# Patient Record
Sex: MALE | Race: BLACK OR AFRICAN AMERICAN | NOT HISPANIC OR LATINO | Employment: FULL TIME | URBAN - METROPOLITAN AREA
[De-identification: names, ages, dates, MRNs, and addresses within clinical notes are randomized per-mention and may not be internally consistent; named-entity substitution may affect disease eponyms.]

---

## 2022-10-07 ENCOUNTER — HOSPITAL ENCOUNTER (INPATIENT)
Facility: HOSPITAL | Age: 42
LOS: 3 days | Discharge: HOME | DRG: 305 | End: 2022-10-10
Attending: EMERGENCY MEDICINE | Admitting: INTERNAL MEDICINE

## 2022-10-07 DIAGNOSIS — R07.9 CHEST PAIN: ICD-10-CM

## 2022-10-07 DIAGNOSIS — R06.02 SHORTNESS OF BREATH: ICD-10-CM

## 2022-10-07 DIAGNOSIS — D63.8 ANEMIA OF CHRONIC DISEASE: ICD-10-CM

## 2022-10-07 DIAGNOSIS — I50.9 CONGESTIVE HEART FAILURE, UNSPECIFIED HF CHRONICITY, UNSPECIFIED HEART FAILURE TYPE: ICD-10-CM

## 2022-10-07 DIAGNOSIS — I50.22 CHRONIC SYSTOLIC HEART FAILURE: Primary | ICD-10-CM

## 2022-10-07 DIAGNOSIS — R79.89 ELEVATED TROPONIN: ICD-10-CM

## 2022-10-07 DIAGNOSIS — I16.1 HYPERTENSIVE EMERGENCY: ICD-10-CM

## 2022-10-07 DIAGNOSIS — N18.32 STAGE 3B CHRONIC KIDNEY DISEASE (CKD): ICD-10-CM

## 2022-10-07 PROBLEM — N17.9 AKI (ACUTE KIDNEY INJURY): Status: ACTIVE | Noted: 2022-10-07

## 2022-10-07 PROBLEM — D64.9 NORMOCYTIC ANEMIA: Status: ACTIVE | Noted: 2022-10-07

## 2022-10-07 LAB
ALBUMIN SERPL BCP-MCNC: 3.5 G/DL (ref 3.5–5.2)
ALP SERPL-CCNC: 78 U/L (ref 55–135)
ALT SERPL W/O P-5'-P-CCNC: 27 U/L (ref 10–44)
AMPHET+METHAMPHET UR QL: NEGATIVE
ANION GAP SERPL CALC-SCNC: 11 MMOL/L (ref 8–16)
AST SERPL-CCNC: 23 U/L (ref 10–40)
BACTERIA #/AREA URNS HPF: ABNORMAL /HPF
BARBITURATES UR QL SCN>200 NG/ML: NEGATIVE
BASOPHILS # BLD AUTO: 0.02 K/UL (ref 0–0.2)
BASOPHILS NFR BLD: 0.2 % (ref 0–1.9)
BENZODIAZ UR QL SCN>200 NG/ML: NEGATIVE
BILIRUB SERPL-MCNC: 0.7 MG/DL (ref 0.1–1)
BILIRUB UR QL STRIP: NEGATIVE
BNP SERPL-MCNC: 869 PG/ML (ref 0–99)
BUN SERPL-MCNC: 23 MG/DL (ref 6–20)
BZE UR QL SCN: NEGATIVE
CALCIUM SERPL-MCNC: 9.4 MG/DL (ref 8.7–10.5)
CANNABINOIDS UR QL SCN: ABNORMAL
CHLORIDE SERPL-SCNC: 107 MMOL/L (ref 95–110)
CK SERPL-CCNC: 430 U/L (ref 20–200)
CLARITY UR: CLEAR
CO2 SERPL-SCNC: 23 MMOL/L (ref 23–29)
COLOR UR: YELLOW
CREAT SERPL-MCNC: 2.2 MG/DL (ref 0.5–1.4)
CREAT UR-MCNC: 272.2 MG/DL (ref 23–375)
CREAT UR-MCNC: 272.2 MG/DL (ref 23–375)
DIFFERENTIAL METHOD: ABNORMAL
EOSINOPHIL # BLD AUTO: 0.1 K/UL (ref 0–0.5)
EOSINOPHIL NFR BLD: 0.4 % (ref 0–8)
ERYTHROCYTE [DISTWIDTH] IN BLOOD BY AUTOMATED COUNT: 13.2 % (ref 11.5–14.5)
EST. GFR  (NO RACE VARIABLE): 37 ML/MIN/1.73 M^2
GLUCOSE SERPL-MCNC: 91 MG/DL (ref 70–110)
GLUCOSE UR QL STRIP: ABNORMAL
HCT VFR BLD AUTO: 36.2 % (ref 40–54)
HGB BLD-MCNC: 11.4 G/DL (ref 14–18)
HGB UR QL STRIP: ABNORMAL
HYALINE CASTS #/AREA URNS LPF: 3 /LPF
IMM GRANULOCYTES # BLD AUTO: 0.05 K/UL (ref 0–0.04)
IMM GRANULOCYTES NFR BLD AUTO: 0.4 % (ref 0–0.5)
KETONES UR QL STRIP: NEGATIVE
LEUKOCYTE ESTERASE UR QL STRIP: NEGATIVE
LYMPHOCYTES # BLD AUTO: 1.6 K/UL (ref 1–4.8)
LYMPHOCYTES NFR BLD: 13.9 % (ref 18–48)
MCH RBC QN AUTO: 29.3 PG (ref 27–31)
MCHC RBC AUTO-ENTMCNC: 31.5 G/DL (ref 32–36)
MCV RBC AUTO: 93 FL (ref 82–98)
METHADONE UR QL SCN>300 NG/ML: NEGATIVE
MICROSCOPIC COMMENT: ABNORMAL
MONOCYTES # BLD AUTO: 0.6 K/UL (ref 0.3–1)
MONOCYTES NFR BLD: 5 % (ref 4–15)
NEUTROPHILS # BLD AUTO: 9.4 K/UL (ref 1.8–7.7)
NEUTROPHILS NFR BLD: 80.1 % (ref 38–73)
NITRITE UR QL STRIP: NEGATIVE
NRBC BLD-RTO: 0 /100 WBC
OPIATES UR QL SCN: NEGATIVE
PCP UR QL SCN>25 NG/ML: NEGATIVE
PH UR STRIP: 6 [PH] (ref 5–8)
PLATELET # BLD AUTO: 480 K/UL (ref 150–450)
PMV BLD AUTO: 8.3 FL (ref 9.2–12.9)
POTASSIUM SERPL-SCNC: 4 MMOL/L (ref 3.5–5.1)
PROT SERPL-MCNC: 6.9 G/DL (ref 6–8.4)
PROT UR QL STRIP: ABNORMAL
PROT UR-MCNC: 153 MG/DL (ref 0–15)
PROT/CREAT UR: 0.56 MG/G{CREAT} (ref 0–0.2)
RBC # BLD AUTO: 3.89 M/UL (ref 4.6–6.2)
RBC #/AREA URNS HPF: 0 /HPF (ref 0–4)
SARS-COV-2 RDRP RESP QL NAA+PROBE: NEGATIVE
SODIUM SERPL-SCNC: 141 MMOL/L (ref 136–145)
SODIUM UR-SCNC: 70 MMOL/L (ref 20–250)
SP GR UR STRIP: 1.03 (ref 1–1.03)
SQUAMOUS #/AREA URNS HPF: 1 /HPF
TOXICOLOGY INFORMATION: ABNORMAL
TROPONIN I SERPL DL<=0.01 NG/ML-MCNC: 0.08 NG/ML (ref 0–0.03)
TROPONIN I SERPL DL<=0.01 NG/ML-MCNC: 0.08 NG/ML (ref 0–0.03)
TROPONIN I SERPL DL<=0.01 NG/ML-MCNC: 0.1 NG/ML (ref 0–0.03)
URN SPEC COLLECT METH UR: ABNORMAL
UROBILINOGEN UR STRIP-ACNC: NEGATIVE EU/DL
UUN UR-MCNC: >1200 MG/DL (ref 140–1050)
WBC # BLD AUTO: 11.69 K/UL (ref 3.9–12.7)
WBC #/AREA URNS HPF: 1 /HPF (ref 0–5)

## 2022-10-07 PROCEDURE — 25000003 PHARM REV CODE 250: Performed by: STUDENT IN AN ORGANIZED HEALTH CARE EDUCATION/TRAINING PROGRAM

## 2022-10-07 PROCEDURE — 84156 ASSAY OF PROTEIN URINE: CPT | Performed by: EMERGENCY MEDICINE

## 2022-10-07 PROCEDURE — 93010 EKG 12-LEAD: ICD-10-PCS | Mod: ,,, | Performed by: INTERNAL MEDICINE

## 2022-10-07 PROCEDURE — 81000 URINALYSIS NONAUTO W/SCOPE: CPT | Mod: 59 | Performed by: EMERGENCY MEDICINE

## 2022-10-07 PROCEDURE — 84540 ASSAY OF URINE/UREA-N: CPT | Performed by: EMERGENCY MEDICINE

## 2022-10-07 PROCEDURE — 96374 THER/PROPH/DIAG INJ IV PUSH: CPT

## 2022-10-07 PROCEDURE — 99900035 HC TECH TIME PER 15 MIN (STAT)

## 2022-10-07 PROCEDURE — 83880 ASSAY OF NATRIURETIC PEPTIDE: CPT | Performed by: EMERGENCY MEDICINE

## 2022-10-07 PROCEDURE — 93010 ELECTROCARDIOGRAM REPORT: CPT | Mod: ,,, | Performed by: INTERNAL MEDICINE

## 2022-10-07 PROCEDURE — 99285 EMERGENCY DEPT VISIT HI MDM: CPT | Mod: 25

## 2022-10-07 PROCEDURE — 25000003 PHARM REV CODE 250: Performed by: EMERGENCY MEDICINE

## 2022-10-07 PROCEDURE — 85025 COMPLETE CBC W/AUTO DIFF WBC: CPT | Performed by: EMERGENCY MEDICINE

## 2022-10-07 PROCEDURE — 96375 TX/PRO/DX INJ NEW DRUG ADDON: CPT | Mod: 59

## 2022-10-07 PROCEDURE — 82550 ASSAY OF CK (CPK): CPT | Performed by: EMERGENCY MEDICINE

## 2022-10-07 PROCEDURE — 25000003 PHARM REV CODE 250: Performed by: INTERNAL MEDICINE

## 2022-10-07 PROCEDURE — 93005 ELECTROCARDIOGRAM TRACING: CPT

## 2022-10-07 PROCEDURE — 63600175 PHARM REV CODE 636 W HCPCS: Performed by: STUDENT IN AN ORGANIZED HEALTH CARE EDUCATION/TRAINING PROGRAM

## 2022-10-07 PROCEDURE — 84484 ASSAY OF TROPONIN QUANT: CPT | Mod: 91 | Performed by: STUDENT IN AN ORGANIZED HEALTH CARE EDUCATION/TRAINING PROGRAM

## 2022-10-07 PROCEDURE — 80307 DRUG TEST PRSMV CHEM ANLYZR: CPT | Performed by: EMERGENCY MEDICINE

## 2022-10-07 PROCEDURE — 84300 ASSAY OF URINE SODIUM: CPT | Performed by: EMERGENCY MEDICINE

## 2022-10-07 PROCEDURE — 20000000 HC ICU ROOM

## 2022-10-07 PROCEDURE — U0002 COVID-19 LAB TEST NON-CDC: HCPCS | Performed by: EMERGENCY MEDICINE

## 2022-10-07 PROCEDURE — 36415 COLL VENOUS BLD VENIPUNCTURE: CPT | Performed by: STUDENT IN AN ORGANIZED HEALTH CARE EDUCATION/TRAINING PROGRAM

## 2022-10-07 PROCEDURE — 80053 COMPREHEN METABOLIC PANEL: CPT | Performed by: EMERGENCY MEDICINE

## 2022-10-07 PROCEDURE — 84484 ASSAY OF TROPONIN QUANT: CPT | Performed by: EMERGENCY MEDICINE

## 2022-10-07 PROCEDURE — 63600175 PHARM REV CODE 636 W HCPCS: Performed by: EMERGENCY MEDICINE

## 2022-10-07 RX ORDER — ASPIRIN 325 MG
325 TABLET ORAL DAILY
Status: DISCONTINUED | OUTPATIENT
Start: 2022-10-08 | End: 2022-10-10

## 2022-10-07 RX ORDER — SODIUM CHLORIDE 0.9 % (FLUSH) 0.9 %
10 SYRINGE (ML) INJECTION
Status: DISCONTINUED | OUTPATIENT
Start: 2022-10-07 | End: 2022-10-10 | Stop reason: HOSPADM

## 2022-10-07 RX ORDER — NITROGLYCERIN 20 MG/100ML
0-200 INJECTION INTRAVENOUS CONTINUOUS
Status: DISCONTINUED | OUTPATIENT
Start: 2022-10-07 | End: 2022-10-09

## 2022-10-07 RX ORDER — METOPROLOL TARTRATE 50 MG/1
50 TABLET ORAL
Status: COMPLETED | OUTPATIENT
Start: 2022-10-07 | End: 2022-10-07

## 2022-10-07 RX ORDER — NITROGLYCERIN 20 MG/100ML
0-200 INJECTION INTRAVENOUS CONTINUOUS
Status: DISCONTINUED | OUTPATIENT
Start: 2022-10-07 | End: 2022-10-07

## 2022-10-07 RX ORDER — HYDRALAZINE HYDROCHLORIDE 25 MG/1
25 TABLET, FILM COATED ORAL EVERY 8 HOURS
Status: DISCONTINUED | OUTPATIENT
Start: 2022-10-07 | End: 2022-10-08

## 2022-10-07 RX ORDER — MAGNESIUM SULFATE HEPTAHYDRATE 40 MG/ML
2 INJECTION, SOLUTION INTRAVENOUS ONCE
Status: COMPLETED | OUTPATIENT
Start: 2022-10-07 | End: 2022-10-07

## 2022-10-07 RX ORDER — LABETALOL HYDROCHLORIDE 5 MG/ML
10 INJECTION, SOLUTION INTRAVENOUS
Status: COMPLETED | OUTPATIENT
Start: 2022-10-07 | End: 2022-10-07

## 2022-10-07 RX ORDER — FUROSEMIDE 10 MG/ML
40 INJECTION INTRAMUSCULAR; INTRAVENOUS
Status: COMPLETED | OUTPATIENT
Start: 2022-10-07 | End: 2022-10-07

## 2022-10-07 RX ORDER — MUPIROCIN 20 MG/G
OINTMENT TOPICAL 2 TIMES DAILY
Status: DISCONTINUED | OUTPATIENT
Start: 2022-10-07 | End: 2022-10-10 | Stop reason: HOSPADM

## 2022-10-07 RX ORDER — FUROSEMIDE 10 MG/ML
80 INJECTION INTRAMUSCULAR; INTRAVENOUS ONCE
Status: COMPLETED | OUTPATIENT
Start: 2022-10-07 | End: 2022-10-07

## 2022-10-07 RX ORDER — AMLODIPINE BESYLATE 5 MG/1
5 TABLET ORAL DAILY
Status: DISCONTINUED | OUTPATIENT
Start: 2022-10-08 | End: 2022-10-08

## 2022-10-07 RX ADMIN — MUPIROCIN: 20 OINTMENT TOPICAL at 09:10

## 2022-10-07 RX ADMIN — LABETALOL HYDROCHLORIDE 10 MG: 5 INJECTION INTRAVENOUS at 11:10

## 2022-10-07 RX ADMIN — NITROGLYCERIN 45 MCG/MIN: 20 INJECTION INTRAVENOUS at 08:10

## 2022-10-07 RX ADMIN — FUROSEMIDE 80 MG: 10 INJECTION, SOLUTION INTRAMUSCULAR; INTRAVENOUS at 05:10

## 2022-10-07 RX ADMIN — NITROGLYCERIN 10 MCG/MIN: 20 INJECTION INTRAVENOUS at 03:10

## 2022-10-07 RX ADMIN — HYDRALAZINE HYDROCHLORIDE 25 MG: 25 TABLET, FILM COATED ORAL at 09:10

## 2022-10-07 RX ADMIN — FUROSEMIDE 40 MG: 10 INJECTION, SOLUTION INTRAMUSCULAR; INTRAVENOUS at 01:10

## 2022-10-07 RX ADMIN — METOPROLOL TARTRATE 50 MG: 50 TABLET, FILM COATED ORAL at 02:10

## 2022-10-07 RX ADMIN — MAGNESIUM SULFATE 2 G: 2 INJECTION INTRAVENOUS at 09:10

## 2022-10-07 NOTE — ED TRIAGE NOTES
"Chief Complaint   Patient presents with    Chest Pain     Pt presents to ED today states " feels like someone is standing on my chest" upon standing onset x 2 weeks ago pt reports some relief when sitting down. Denies taking medication for sx   Reports intermittent episodes of vomiting     Shortness of Breath     Patient presents to the ED with intermittent chest pain x months but worsening over the last two weeks. Reports today while "just standing at work I started having really bad pressure in my chest like someone was standing on it." Patient reports dyspnea on exertion and increase in chest pressure with activity and that it eases up with resting. Frequent dry cough noted. Also having complaints of intermittent nausea and vomiting x few days. Trace edema noted to BLE. Denies any medical problems or use of daily home medications. Denies dizziness, headache, or other symptoms at this time. Denies history of hypertension.   "

## 2022-10-07 NOTE — MEDICAL/APP STUDENT
[facility] Medicine H&P Note     Admitting Team: \[facility] [place] [facility]\"" Hospitalist Team A  Attending Physician: Jorge  Resident: Brandt  Intern: Jg    Date of Admit: 10/7/2022    Chief Complaint     SOB/SCHMITZ starting about 1.5 wks ago    Subjective:      History of Present Illness:  Abe Pak is a 41 y.o. 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 Pt also c/o chest tightness concurrent w/ his SOB/SCHMITZ. These sx have been progressively worsening since their onset leading up to his admission today. They improve with sitting and worsen w/ any activity. He also c/o diaphoresis, N/V (emesis c/w stomach acid), BLE swelling, subjective fevers and productive cough w/ yellow sputum. Denies HA, vision changes, abdominal pain or stool changes. He has never had a similar episode like this in the past. Pt takes no medications. He rarely uses alcohol (few drinks / month), does not smoke and uses marijuana (~1 / month).     Past Medical History:  Pasha angina    Past Surgical History:  Tooth extraction and I&D 2/2 Pasha angina    Allergies:  Review of patient's allergies indicates:  No Known Allergies    Home Medications:  Prior to Admission medications    Medication Sig Start Date End Date Taking? Authorizing Provider   ibuprofen (ADVIL,MOTRIN) 600 MG tablet Take 1 tablet (600 mg total) by mouth every 6 (six) hours as needed for Pain. 6/27/14 10/7/22  RODRIGO Dawn       Family History:  History reviewed. No pertinent family history.    Social History:  Social History     Tobacco Use    Smoking status: Former    Smokeless tobacco: Never   Substance Use Topics    Alcohol use: Yes     Comment: occasional    Drug use: No       Review of Systems:  Review of Systems   Constitutional:  Positive for diaphoresis and fever. Negative for chills.   Eyes:  Negative for blurred vision.   Respiratory:  Positive for cough, sputum production and shortness of breath.   "  Cardiovascular:  Positive for chest pain and leg swelling.   Gastrointestinal:  Positive for nausea and vomiting. Negative for abdominal pain, constipation and diarrhea.   Neurological:  Negative for headaches.   All other systems are reviewed and are negative.    Health Maintaince :   Primary Care Physician: None    Immunizations:   Tdap pt unsure    Flu has not received this year   Pna has not received  COVID has not received       Objective:   Last 24 Hour Vital Signs:  BP  Min: 112/65  Max: 213/138  Temp  Av °F (37.2 °C)  Min: 99 °F (37.2 °C)  Max: 99 °F (37.2 °C)  Pulse  Av.8  Min: 98  Max: 121  Resp  Av.6  Min: 20  Max: 31  SpO2  Av.9 %  Min: 92 %  Max: 100 %  Height  Av' 7" (170.2 cm)  Min: 5' 7" (170.2 cm)  Max: 5' 7" (170.2 cm)  Weight  Av.9 kg (260 lb)  Min: 117.9 kg (260 lb)  Max: 117.9 kg (260 lb)  Body mass index is 40.72 kg/m².  No intake/output data recorded.    Physical Examination:  Physical Exam  Vitals and nursing note reviewed.   Constitutional:       Appearance: He is obese. He is not diaphoretic.   HENT:      Head: Normocephalic and atraumatic.   Eyes:      Extraocular Movements: Extraocular movements intact.      Conjunctiva/sclera: Conjunctivae normal.   Neck:      Comments: Elevated JVP  Cardiovascular:      Rate and Rhythm: Regular rhythm. Tachycardia present.      Heart sounds: Normal heart sounds.   Pulmonary:      Effort: Respiratory distress present.      Breath sounds: Normal breath sounds.   Abdominal:      General: Abdomen is flat.      Palpations: Abdomen is soft.   Musculoskeletal:         General: Normal range of motion.      Cervical back: Normal range of motion.   Skin:     General: Skin is warm and dry.   Neurological:      General: No focal deficit present.      Mental Status: He is alert and oriented to person, place, and time.   Psychiatric:         Behavior: Behavior normal.         Thought Content: Thought content normal. "     Laboratory:  Most Recent Data:  CBC:   Lab Results   Component Value Date    WBC 11.69 10/07/2022    HGB 11.4 (L) 10/07/2022    HCT 36.2 (L) 10/07/2022     (H) 10/07/2022    MCV 93 10/07/2022    RDW 13.2 10/07/2022     WBC Differential: 80.1 % Gran, 13.9 % L, 5 % M, 0.4 % Eo, 0.2 % Baso    BMP:   Lab Results   Component Value Date     10/07/2022    K 4.0 10/07/2022     10/07/2022    CO2 23 10/07/2022    BUN 23 (H) 10/07/2022    CREATININE 2.2 (H) 10/07/2022    GLU 91 10/07/2022    CALCIUM 9.4 10/07/2022     LFTs:   Lab Results   Component Value Date    PROT 6.9 10/07/2022    ALBUMIN 3.5 10/07/2022    BILITOT 0.7 10/07/2022    AST 23 10/07/2022    ALKPHOS 78 10/07/2022    ALT 27 10/07/2022     Coags: No results found for: INR, PROTIME, PTT  FLP: No results found for: CHOL, HDL, LDLCALC, TRIG, CHOLHDL  DM:   Lab Results   Component Value Date    CREATININE 2.2 (H) 10/07/2022     Thyroid: No results found for: TSH, FREET4, N7USBYG, J4ZJHYE, THYROIDAB  Anemia: No results found for: IRON, TIBC, FERRITIN, BBGZKYQL02, FOLATE  Cardiac:   Lab Results   Component Value Date    TROPONINI 0.079 (H) 10/07/2022     (H) 10/07/2022     Urinalysis:   Lab Results   Component Value Date    COLORU Yellow 10/07/2022    SPECGRAV 1.030 10/07/2022    NITRITE Negative 10/07/2022    KETONESU Negative 10/07/2022    UROBILINOGEN Negative 10/07/2022    WBCUA 1 10/07/2022       Microbiology Data:  SARS-CoV-2: Negative    Other Results:  EKG (my interpretation):   Results for orders placed or performed during the hospital encounter of 10/07/22   EKG 12-lead    Collection Time: 10/07/22 10:47 AM    Narrative    Test Reason : R07.9,    Vent. Rate : 117 BPM     Atrial Rate : 117 BPM     P-R Int : 146 ms          QRS Dur : 096 ms      QT Int : 308 ms       P-R-T Axes : 000 203 080 degrees     QTc Int : 429 ms    Sinus tachycardia  Right superior axis deviation  LVH with secondary ST-T wave changes  Abnormal ECG  No  previous ECGs available  Confirmed by Khadra Rodriguez MD ([id number]) on 10/7/2022 3:33:11 PM    Referred By: [email or web address]   SELF           Confirmed By:Khadra Rodriguez MD       Radiology:  Imaging Results              X-Ray Chest 1 View (Final result)  Result time 10/07/22 12:16:51      Final result by Lv Hung MD (10/07/22 12:16:51)                   Impression:      Findings concerning for multifocal pneumonia from infectious or inflammatory process versus aspiration in the proper clinical setting, right more so than the left.    Interval enlarged cardiac silhouette.      Electronically signed by: Lv Hung MD  Date:    10/07/2022  Time:    12:16               Narrative:    EXAMINATION:  XR CHEST 1 VIEW    CLINICAL HISTORY:  Shortness of breath;    TECHNIQUE:  Single frontal view of the chest was performed.    COMPARISON:  Chest radiograph 04/17/2008    FINDINGS:  Monitoring leads overlie the chest.  Patient is slightly rotated.  Large body habitus.    Mediastinal structures are midline.  The heart is now mild to moderately enlarged which may reflect cardiomegaly and/or pericardial fluid.  Mediastinal contours are within normal limits.  Hilar contours are within normal limits.  Similar slight nonspecific elevation of the right hemidiaphragm.    The lungs are well expanded.  Patchy opacities noted throughout the right mid to lower lung zone and to a lesser extent left lung base.  No pleural effusion or pneumothorax.  No acute osseous process seen.  PA and lateral views can be obtained.                                         Assessment:     Abe Pak is a 41 y.o. with a PMH of Pasha angina who present for SOB/SCHMITZ w/ chest tightness starting 1.5 wks ago.     Patient Active Problem List    Diagnosis Date Noted    GILMER (acute kidney injury) 10/07/2022    Normocytic anemia 10/07/2022    Elevated troponin 10/07/2022    Hypertensive emergency 10/07/2022        Plan:     Hypertensive emergency  - Pt w/ BP in 200s/150s  on arrival to ED  - Elevated troponin, creatinine  - In ED given Labetalol 10, metoprolol tartrate 50, Lasix 40  - During initial medicine interview pt's BP decreased to 112/65  - Lasix 80 scheduled 1630  - Hydralazine 25 q8h  - Start amlodipine 5 and  tmrw qd  - Nitro 50 drip  - Admitted to ICU for further observation and control of BP    Elevated troponin  - Initial trop 0.079  - EKG: s tach, R axis deviation, LVH w/ secondary ST-T wave changes    GILMER  - Initial creatinine 2.2  - UA w/ 2+ protein, 1+ blood and 3 hyaline casts    Normocytic anemia  - Initial Hb/Hct 11.4/36.2    Healthcare Maintenance  - Tdap pt unsure    - Flu has not received this year   - Pna has not received  - COVID has not received    Diet: Cardiac  DVT: None for now  Dispo: Admit to ICU    Code Status:     Full    Lei Rico, MS4    [facility] Medicine Hospitalist Pager numbers:   U Hospitalist Medicine Team A (Jorge/Leon): [phone]  U Hospitalist Medicine Team B (Aimee/Noel):  [phone]

## 2022-10-07 NOTE — NURSING
Pt arrived to ICU @ 1615. Pt walked from stretcher to bed with no complaints of SOB or CP. Pt is AAO x 4. RR even and unlabored. Sats 94% on 2L NC. St on monitor. . /132. Bilat PIVS are CDI and secured. Nitro gtt infusing @ 15mcg/min and increased per orders. Mother at bs. SR x 3. Bed alarm on. Call light within easy reach. Will cont to monitor.

## 2022-10-07 NOTE — LETTER
October 10, 2022         [address] [place] [address] [place] [id number]  Phone: [phone]  Fax: [phone]       Patient: Abe Pak   YOB: 1980  Date of Visit: 10/10/2022    To Whom It May Concern:    Abe Pak was under my care at [facility] on Between 10/7/22- 10/10/2022. The patient may return to work/school on 10/12/22 with no restrictions. If you have any questions or concerns, or if I can be of further assistance, please do not hesitate to contact me.    Sincerely,    Ronnell Carlton MD

## 2022-10-07 NOTE — PHARMACY MED REC
"    [facility]           Pharmacy  Admission Medication History     The home medication history was taken by Desiree Andrew.      Medication history obtained from Medications listed below were obtained from: Patient/family    Based on information gathered for medication list, you may go to "Admission" then "Reconcile Home Medications" tabs to review and/or act upon those items.     The home medication list has been updated by the Pharmacy department.   Please read ALL comments highlighted in yellow.   Please address this information as you see fit.    Feel free to contact us if you have any questions or require assistance.      No current facility-administered medications on file prior to encounter.     Current Outpatient Medications on File Prior to Encounter   Medication Sig Dispense Refill       Please address this information as you see fit.  Feel free to contact us if you have any questions or require assistance.    Desiree Andrew  [phone]              .        "

## 2022-10-07 NOTE — ED PROVIDER NOTES
"Encounter Date: 10/7/2022    SCRIBE #1 NOTE: I, Ansonpelon Weston Jr, am scribing for, and in the presence of,  Milo Walter MD. I have scribed the following portions of the note - Other sections scribed: HPI, ROS, PE, MDM.     History     Chief Complaint   Patient presents with    Chest Pain     Pt presents to ED today states " feels like someone is standing on my chest" upon standing onset x 2 weeks ago pt reports some relief when sitting down. Denies taking medication for sx   Reports intermittent episodes of vomiting     Shortness of Breath     Abe Pak is a 41 y.o. male who has no past medical history on file.The patient presents to the emergency department due to chest pain; onset two weeks. Associated symptoms include chest pressure, dry cough, nausea, shortness of breath, and vomiting.The patient reported developing intermittent, mid-sternal chest pain that does not radiate; chest pain worsens with movement. In addition to the chief complaint, patient complains of chest pressure and intermittent episodes of shortness of breath; chest pressure worsens with exertion. He did not take medication for symptoms. In the emergency department, patient is currently experiencing all the symptoms mentioned, as well as intermittent episodes of nausea with occasional vomiting. Patient denies fever. He denies a personal or family history of hypertension; blood pressure was 174/129 when measured in triage. He has no known allergies to medication.        The history is provided by the patient. No  was used.   Review of patient's allergies indicates:  No Known Allergies  History reviewed. No pertinent past medical history.  History reviewed. No pertinent surgical history.  History reviewed. No pertinent family history.  Social History     Tobacco Use    Smoking status: Former    Smokeless tobacco: Never   Substance Use Topics    Alcohol use: Yes     Comment: occasional    Drug use: No     Review of " Systems   Constitutional:  Negative for fever.   Respiratory:  Positive for cough and shortness of breath.    Cardiovascular:  Positive for chest pain.        Positive chest pressure.   Gastrointestinal:  Positive for nausea and vomiting.   All other systems reviewed and are negative.    Physical Exam     Initial Vitals   BP Pulse Resp Temp SpO2   10/07/22 1050 10/07/22 1046 10/07/22 1046 10/07/22 1050 10/07/22 1046   (!) 200/133 (!) 116 (!) 22 99 °F (37.2 °C) 100 %      MAP       --                Physical Exam    Nursing note and vitals reviewed.  HENT:   Head: Normocephalic and atraumatic.   Eyes: Conjunctivae are normal.   Neck: Neck supple.   Normal range of motion.  Cardiovascular:  Normal rate, regular rhythm and normal heart sounds.           Pulmonary/Chest:   Faint crackles at the bases bilaterally.   Abdominal: Abdomen is soft. There is no abdominal tenderness.   Musculoskeletal:      Cervical back: Normal range of motion and neck supple.      Comments: Pitting edema to the lower extremities bilaterally.     Skin: Skin is warm and dry. No rash noted.   Psychiatric: He has a normal mood and affect. His behavior is normal. Judgment and thought content normal.       ED Course   Critical Care    Date/Time: 10/7/2022 2:14 PM  Performed by: Milo Walter MD  Authorized by: Milo Walter MD   Direct patient critical care time: 60 minutes  Ordering / reviewing critical care time: 15 minutes  Documentation critical care time: 15 minutes  Consulting other physicians critical care time: 5 minutes  Total critical care time (exclusive of procedural time) : 95 minutes  Critical care was time spent personally by me on the following activities: examination of patient, obtaining history from patient or surrogate, ordering and performing treatments and interventions, ordering and review of laboratory studies, ordering and review of radiographic studies, pulse oximetry, re-evaluation of patient's  condition and discussions with primary provider.      Labs Reviewed   DRUG SCREEN PANEL, URINE EMERGENCY - Abnormal; Notable for the following components:       Result Value    THC Presumptive Positive (*)     All other components within normal limits    Narrative:     Specimen Source->Urine   CBC W/ AUTO DIFFERENTIAL - Abnormal; Notable for the following components:    RBC 3.89 (*)     Hemoglobin 11.4 (*)     Hematocrit 36.2 (*)     MCHC 31.5 (*)     Platelets 480 (*)     MPV 8.3 (*)     Gran # (ANC) 9.4 (*)     Immature Grans (Abs) 0.05 (*)     Gran % 80.1 (*)     Lymph % 13.9 (*)     All other components within normal limits   COMPREHENSIVE METABOLIC PANEL - Abnormal; Notable for the following components:    BUN 23 (*)     Creatinine 2.2 (*)     eGFR 37 (*)     All other components within normal limits   CK - Abnormal; Notable for the following components:     (*)     All other components within normal limits   TROPONIN I - Abnormal; Notable for the following components:    Troponin I 0.079 (*)     All other components within normal limits   B-TYPE NATRIURETIC PEPTIDE - Abnormal; Notable for the following components:     (*)     All other components within normal limits   URINALYSIS   URINALYSIS MICROSCOPIC     EKG Readings: (Independently Interpreted)   Initial Reading: No STEMI. Rhythm: Sinus Tachycardia. Heart Rate: 117. ST Segments: Normal ST Segments.     Imaging Results              X-Ray Chest 1 View (Final result)  Result time 10/07/22 12:16:51      Final result by Lv Hung MD (10/07/22 12:16:51)                   Impression:      Findings concerning for multifocal pneumonia from infectious or inflammatory process versus aspiration in the proper clinical setting, right more so than the left.    Interval enlarged cardiac silhouette.      Electronically signed by: Lv Hung MD  Date:    10/07/2022  Time:    12:16               Narrative:    EXAMINATION:  XR CHEST 1 VIEW    CLINICAL  HISTORY:  Shortness of breath;    TECHNIQUE:  Single frontal view of the chest was performed.    COMPARISON:  Chest radiograph 04/17/2008    FINDINGS:  Monitoring leads overlie the chest.  Patient is slightly rotated.  Large body habitus.    Mediastinal structures are midline.  The heart is now mild to moderately enlarged which may reflect cardiomegaly and/or pericardial fluid.  Mediastinal contours are within normal limits.  Hilar contours are within normal limits.  Similar slight nonspecific elevation of the right hemidiaphragm.    The lungs are well expanded.  Patchy opacities noted throughout the right mid to lower lung zone and to a lesser extent left lung base.  No pleural effusion or pneumothorax.  No acute osseous process seen.  PA and lateral views can be obtained.                                       Medications   labetaloL injection 10 mg (10 mg Intravenous Given 10/7/22 1140)   furosemide injection 40 mg (40 mg Intravenous Given 10/7/22 1311)   metoprolol tartrate (LOPRESSOR) tablet 50 mg (50 mg Oral Given 10/7/22 1413)     Medical Decision Making:   History:   Old Medical Records: I decided to obtain old medical records.  Initial Assessment:   Abe Pak is a 41 y.o. male who has no past medical history on file.The patient presents to the emergency department due to chest pain; onset two weeks. Associated symptoms include chest pressure, dry cough, nausea, shortness of breath, and vomiting.  Differential Diagnosis:   Differential Diagnosis includes, but is not limited to:  ACS/MI, PE, aortic dissection, pneumothorax, cardiac tamponade, pericarditis/myocarditis, pneumonia, infection/abscess, lung mass, trauma/fracture, costochondritis/pleurisy, heart failure, anemia   Clinical Tests:   Lab Tests: Ordered and Reviewed  Radiological Study: Ordered and Reviewed  Medical Tests: Ordered and Reviewed  ED Management:  Chest x-ray shows cardiomegaly with probable pulmonary edema.  BNP and troponin are both  elevated.  Patient was given labetalol as well as IV Lasix here in the emergency department.  I feel the patient will require admission for further workup of congestive heart failure, and be admitted by [facility].        Scribe Attestation:   Scribe #1: I performed the above scribed service and the documentation accurately describes the services I performed. I attest to the accuracy of the note.                 I, Dr. Milo Walter, personally performed the services described in this documentation. All medical record entries made by the scribe were at my direction and in my presence. I have reviewed the chart and agree that the record reflects my personal performance and is accurate and complete. Milo Walter MD.  2:14 PM 10/07/2022    Clinical Impression:   Final diagnoses:  [R07.9] Chest pain  [R06.02] Shortness of breath  [I50.9] Congestive heart failure, unspecified HF chronicity, unspecified heart failure type (Primary)  [R77.8] Elevated troponin        ED Disposition Condition    Observation Stable                Milo Walter MD  10/07/22 1416       Milo Walter MD  10/07/22 2112

## 2022-10-08 LAB
ALBUMIN SERPL BCP-MCNC: 3.1 G/DL (ref 3.5–5.2)
ALP SERPL-CCNC: 64 U/L (ref 55–135)
ALT SERPL W/O P-5'-P-CCNC: 23 U/L (ref 10–44)
ANION GAP SERPL CALC-SCNC: 14 MMOL/L (ref 8–16)
AORTIC ROOT ANNULUS: 3.84 CM
AST SERPL-CCNC: 20 U/L (ref 10–40)
AV INDEX (PROSTH): 0.68
AV MEAN GRADIENT: 3 MMHG
AV PEAK GRADIENT: 5 MMHG
AV VALVE AREA: 3.27 CM2
AV VELOCITY RATIO: 0.59
BASOPHILS # BLD AUTO: 0.03 K/UL (ref 0–0.2)
BASOPHILS NFR BLD: 0.2 % (ref 0–1.9)
BILIRUB SERPL-MCNC: 0.7 MG/DL (ref 0.1–1)
BSA FOR ECHO PROCEDURE: 2.34 M2
BUN SERPL-MCNC: 25 MG/DL (ref 6–20)
CALCIUM SERPL-MCNC: 9 MG/DL (ref 8.7–10.5)
CHLORIDE SERPL-SCNC: 105 MMOL/L (ref 95–110)
CHOLEST SERPL-MCNC: 149 MG/DL (ref 120–199)
CHOLEST/HDLC SERPL: 3.6 {RATIO} (ref 2–5)
CO2 SERPL-SCNC: 21 MMOL/L (ref 23–29)
CREAT SERPL-MCNC: 2.3 MG/DL (ref 0.5–1.4)
CV ECHO LV RWT: 0.47 CM
DIFFERENTIAL METHOD: ABNORMAL
DOP CALC AO PEAK VEL: 1.17 M/S
DOP CALC AO VTI: 18.6 CM
DOP CALC LVOT AREA: 4.8 CM2
DOP CALC LVOT DIAMETER: 2.47 CM
DOP CALC LVOT PEAK VEL: 0.69 M/S
DOP CALC LVOT STROKE VOLUME: 60.82 CM3
DOP CALCLVOT PEAK VEL VTI: 12.7 CM
E/E' RATIO: 13.43 M/S
ECHO LV POSTERIOR WALL: 1.46 CM (ref 0.6–1.1)
EJECTION FRACTION: 30 %
EOSINOPHIL # BLD AUTO: 0.1 K/UL (ref 0–0.5)
EOSINOPHIL NFR BLD: 0.4 % (ref 0–8)
ERYTHROCYTE [DISTWIDTH] IN BLOOD BY AUTOMATED COUNT: 13.3 % (ref 11.5–14.5)
EST. GFR  (NO RACE VARIABLE): 35 ML/MIN/1.73 M^2
ESTIMATED AVG GLUCOSE: 91 MG/DL (ref 68–131)
FERRITIN SERPL-MCNC: 185 NG/ML (ref 20–300)
FRACTIONAL SHORTENING: 24 % (ref 28–44)
GLUCOSE SERPL-MCNC: 128 MG/DL (ref 70–110)
HBA1C MFR BLD: 4.8 % (ref 4–5.6)
HCT VFR BLD AUTO: 32.1 % (ref 40–54)
HDLC SERPL-MCNC: 41 MG/DL (ref 40–75)
HDLC SERPL: 27.5 % (ref 20–50)
HGB BLD-MCNC: 10.2 G/DL (ref 14–18)
IMM GRANULOCYTES # BLD AUTO: 0.06 K/UL (ref 0–0.04)
IMM GRANULOCYTES NFR BLD AUTO: 0.4 % (ref 0–0.5)
INTERVENTRICULAR SEPTUM: 1.91 CM (ref 0.6–1.1)
IRON SERPL-MCNC: 25 UG/DL (ref 45–160)
IVC DIAMETER: 1.17 CM
IVRT: 114.18 MSEC
LA MAJOR: 6.47 CM
LA MINOR: 7.29 CM
LDLC SERPL CALC-MCNC: 92.6 MG/DL (ref 63–159)
LEFT ATRIUM SIZE: 4.43 CM
LEFT ATRIUM VOLUME INDEX MOD: 41.9 ML/M2
LEFT ATRIUM VOLUME MOD: 94.32 CM3
LEFT INTERNAL DIMENSION IN SYSTOLE: 4.67 CM (ref 2.1–4)
LEFT VENTRICLE DIASTOLIC VOLUME INDEX: 85.41 ML/M2
LEFT VENTRICLE DIASTOLIC VOLUME: 192.17 ML
LEFT VENTRICLE MASS INDEX: 234 G/M2
LEFT VENTRICLE SYSTOLIC VOLUME INDEX: 44.7 ML/M2
LEFT VENTRICLE SYSTOLIC VOLUME: 100.68 ML
LEFT VENTRICULAR INTERNAL DIMENSION IN DIASTOLE: 6.17 CM (ref 3.5–6)
LEFT VENTRICULAR MASS: 527.56 G
LV LATERAL E/E' RATIO: 10.44 M/S
LV SEPTAL E/E' RATIO: 18.8 M/S
LVOT MG: 1.16 MMHG
LVOT MV: 0.51 CM/S
LYMPHOCYTES # BLD AUTO: 1.7 K/UL (ref 1–4.8)
LYMPHOCYTES NFR BLD: 12.1 % (ref 18–48)
MAGNESIUM SERPL-MCNC: 2.1 MG/DL (ref 1.6–2.6)
MCH RBC QN AUTO: 29.4 PG (ref 27–31)
MCHC RBC AUTO-ENTMCNC: 31.8 G/DL (ref 32–36)
MCV RBC AUTO: 93 FL (ref 82–98)
MONOCYTES # BLD AUTO: 0.9 K/UL (ref 0.3–1)
MONOCYTES NFR BLD: 6.9 % (ref 4–15)
MV PEAK E VEL: 0.94 M/S
NEUTROPHILS # BLD AUTO: 10.9 K/UL (ref 1.8–7.7)
NEUTROPHILS NFR BLD: 80 % (ref 38–73)
NONHDLC SERPL-MCNC: 108 MG/DL
NRBC BLD-RTO: 0 /100 WBC
PISA MRMAX VEL: 4.74 M/S
PISA TR MAX VEL: 2.4 M/S
PLATELET # BLD AUTO: 456 K/UL (ref 150–450)
PMV BLD AUTO: 8.9 FL (ref 9.2–12.9)
POTASSIUM SERPL-SCNC: 3.7 MMOL/L (ref 3.5–5.1)
PROT SERPL-MCNC: 6.3 G/DL (ref 6–8.4)
PULM VEIN S/D RATIO: 1.03
PV PEAK D VEL: 0.29 M/S
PV PEAK S VEL: 0.3 M/S
RA MAJOR: 5.91 CM
RA PRESSURE: 3 MMHG
RA WIDTH: 3.24 CM
RBC # BLD AUTO: 3.47 M/UL (ref 4.6–6.2)
RIGHT VENTRICULAR END-DIASTOLIC DIMENSION: 2.16 CM
RV TISSUE DOPPLER FREE WALL SYSTOLIC VELOCITY 1 (APICAL 4 CHAMBER VIEW): 0.02 CM/S
SATURATED IRON: 7 % (ref 20–50)
SODIUM SERPL-SCNC: 140 MMOL/L (ref 136–145)
TDI LATERAL: 0.09 M/S
TDI SEPTAL: 0.05 M/S
TDI: 0.07 M/S
TOTAL IRON BINDING CAPACITY: 337 UG/DL (ref 250–450)
TR MAX PG: 23 MMHG
TRANSFERRIN SERPL-MCNC: 228 MG/DL (ref 200–375)
TRIGL SERPL-MCNC: 77 MG/DL (ref 30–150)
TSH SERPL DL<=0.005 MIU/L-ACNC: 0.46 UIU/ML (ref 0.4–4)
TV REST PULMONARY ARTERY PRESSURE: 26 MMHG
WBC # BLD AUTO: 13.67 K/UL (ref 3.9–12.7)

## 2022-10-08 PROCEDURE — 25000003 PHARM REV CODE 250: Performed by: INTERNAL MEDICINE

## 2022-10-08 PROCEDURE — 85025 COMPLETE CBC W/AUTO DIFF WBC: CPT | Performed by: STUDENT IN AN ORGANIZED HEALTH CARE EDUCATION/TRAINING PROGRAM

## 2022-10-08 PROCEDURE — 82728 ASSAY OF FERRITIN: CPT | Performed by: STUDENT IN AN ORGANIZED HEALTH CARE EDUCATION/TRAINING PROGRAM

## 2022-10-08 PROCEDURE — 36415 COLL VENOUS BLD VENIPUNCTURE: CPT | Performed by: STUDENT IN AN ORGANIZED HEALTH CARE EDUCATION/TRAINING PROGRAM

## 2022-10-08 PROCEDURE — 83735 ASSAY OF MAGNESIUM: CPT | Performed by: STUDENT IN AN ORGANIZED HEALTH CARE EDUCATION/TRAINING PROGRAM

## 2022-10-08 PROCEDURE — 84244 ASSAY OF RENIN: CPT | Performed by: STUDENT IN AN ORGANIZED HEALTH CARE EDUCATION/TRAINING PROGRAM

## 2022-10-08 PROCEDURE — 25000003 PHARM REV CODE 250: Performed by: STUDENT IN AN ORGANIZED HEALTH CARE EDUCATION/TRAINING PROGRAM

## 2022-10-08 PROCEDURE — 83835 ASSAY OF METANEPHRINES: CPT | Performed by: STUDENT IN AN ORGANIZED HEALTH CARE EDUCATION/TRAINING PROGRAM

## 2022-10-08 PROCEDURE — 20000000 HC ICU ROOM

## 2022-10-08 PROCEDURE — 83036 HEMOGLOBIN GLYCOSYLATED A1C: CPT | Performed by: STUDENT IN AN ORGANIZED HEALTH CARE EDUCATION/TRAINING PROGRAM

## 2022-10-08 PROCEDURE — 84466 ASSAY OF TRANSFERRIN: CPT | Performed by: STUDENT IN AN ORGANIZED HEALTH CARE EDUCATION/TRAINING PROGRAM

## 2022-10-08 PROCEDURE — 80061 LIPID PANEL: CPT | Performed by: STUDENT IN AN ORGANIZED HEALTH CARE EDUCATION/TRAINING PROGRAM

## 2022-10-08 PROCEDURE — 80053 COMPREHEN METABOLIC PANEL: CPT | Performed by: STUDENT IN AN ORGANIZED HEALTH CARE EDUCATION/TRAINING PROGRAM

## 2022-10-08 PROCEDURE — 84443 ASSAY THYROID STIM HORMONE: CPT | Performed by: STUDENT IN AN ORGANIZED HEALTH CARE EDUCATION/TRAINING PROGRAM

## 2022-10-08 PROCEDURE — 94761 N-INVAS EAR/PLS OXIMETRY MLT: CPT

## 2022-10-08 PROCEDURE — 99900035 HC TECH TIME PER 15 MIN (STAT)

## 2022-10-08 PROCEDURE — 63600175 PHARM REV CODE 636 W HCPCS: Performed by: STUDENT IN AN ORGANIZED HEALTH CARE EDUCATION/TRAINING PROGRAM

## 2022-10-08 RX ORDER — HYDRALAZINE HYDROCHLORIDE 25 MG/1
50 TABLET, FILM COATED ORAL EVERY 8 HOURS
Status: DISCONTINUED | OUTPATIENT
Start: 2022-10-08 | End: 2022-10-08

## 2022-10-08 RX ORDER — HYDRALAZINE HYDROCHLORIDE 25 MG/1
25 TABLET, FILM COATED ORAL ONCE
Status: COMPLETED | OUTPATIENT
Start: 2022-10-08 | End: 2022-10-08

## 2022-10-08 RX ORDER — FUROSEMIDE 10 MG/ML
80 INJECTION INTRAMUSCULAR; INTRAVENOUS ONCE
Status: COMPLETED | OUTPATIENT
Start: 2022-10-08 | End: 2022-10-08

## 2022-10-08 RX ORDER — AMLODIPINE BESYLATE 5 MG/1
10 TABLET ORAL DAILY
Status: DISCONTINUED | OUTPATIENT
Start: 2022-10-09 | End: 2022-10-10 | Stop reason: HOSPADM

## 2022-10-08 RX ORDER — CHLORTHALIDONE 25 MG/1
25 TABLET ORAL DAILY
Status: CANCELLED | OUTPATIENT
Start: 2022-10-08

## 2022-10-08 RX ORDER — ENOXAPARIN SODIUM 100 MG/ML
40 INJECTION SUBCUTANEOUS EVERY 24 HOURS
Status: DISCONTINUED | OUTPATIENT
Start: 2022-10-08 | End: 2022-10-10 | Stop reason: HOSPADM

## 2022-10-08 RX ORDER — HYDRALAZINE HYDROCHLORIDE 25 MG/1
100 TABLET, FILM COATED ORAL EVERY 8 HOURS
Status: DISCONTINUED | OUTPATIENT
Start: 2022-10-08 | End: 2022-10-10 | Stop reason: HOSPADM

## 2022-10-08 RX ORDER — CHLORTHALIDONE 25 MG/1
25 TABLET ORAL DAILY
Status: DISCONTINUED | OUTPATIENT
Start: 2022-10-08 | End: 2022-10-08

## 2022-10-08 RX ORDER — LOSARTAN POTASSIUM 25 MG/1
25 TABLET ORAL DAILY
Status: DISCONTINUED | OUTPATIENT
Start: 2022-10-08 | End: 2022-10-08

## 2022-10-08 RX ORDER — ACETAMINOPHEN 500 MG
500 TABLET ORAL ONCE
Status: COMPLETED | OUTPATIENT
Start: 2022-10-08 | End: 2022-10-08

## 2022-10-08 RX ADMIN — AMLODIPINE BESYLATE 5 MG: 5 TABLET ORAL at 08:10

## 2022-10-08 RX ADMIN — ACETAMINOPHEN 500 MG: 500 TABLET ORAL at 05:10

## 2022-10-08 RX ADMIN — HYDRALAZINE HYDROCHLORIDE 25 MG: 25 TABLET, FILM COATED ORAL at 05:10

## 2022-10-08 RX ADMIN — FUROSEMIDE 80 MG: 10 INJECTION, SOLUTION INTRAMUSCULAR; INTRAVENOUS at 05:10

## 2022-10-08 RX ADMIN — MUPIROCIN: 20 OINTMENT TOPICAL at 09:10

## 2022-10-08 RX ADMIN — FUROSEMIDE 80 MG: 10 INJECTION, SOLUTION INTRAMUSCULAR; INTRAVENOUS at 07:10

## 2022-10-08 RX ADMIN — ASPIRIN 325 MG: 325 TABLET ORAL at 08:10

## 2022-10-08 RX ADMIN — CHLORTHALIDONE 25 MG: 25 TABLET ORAL at 10:10

## 2022-10-08 RX ADMIN — ENOXAPARIN SODIUM 40 MG: 100 INJECTION SUBCUTANEOUS at 04:10

## 2022-10-08 RX ADMIN — HYDRALAZINE HYDROCHLORIDE 100 MG: 25 TABLET, FILM COATED ORAL at 09:10

## 2022-10-08 RX ADMIN — HYDRALAZINE HYDROCHLORIDE 100 MG: 25 TABLET, FILM COATED ORAL at 02:10

## 2022-10-08 RX ADMIN — LOSARTAN POTASSIUM 25 MG: 25 TABLET, FILM COATED ORAL at 09:10

## 2022-10-08 RX ADMIN — HYDRALAZINE HYDROCHLORIDE 25 MG: 25 TABLET, FILM COATED ORAL at 07:10

## 2022-10-08 NOTE — PROGRESS NOTES
Pulmonary/Critical Care     Patient seen and examined by me. I agree with the trainee's separate note except as modified.     41 year old with no known PMH came in with SOB and chest tightness x2 weeks, along with cough and sputum production. He was noted to be hypertensive and placed on a nitro gtt.      Major overnight events: HA reported     Vitals: Afebrile, HR 90-120s, -140 on nitro. 99% on 2L/min this AM (now 94% on RA)     Significant lab findings: WBC 11 to 13. , CTNI 0.095 to 0.083.     CXR reviewed by me: significant enlargement in cardiac shadow. R>L patchy opacities with possible small pleural effusion     Key exam findings: no peripheral edema. Breathing comfortably on room air.     Sedation: none  Vasopressors: none   Other drips: nitro gtt @10   Lines and invasive devices: none  Fluid Balance: 24 hr: -3.5L  Creatinine: 2.2 to 2.3  Antibiotics/Day #: none indicated   Bowels: Last Bowel Movement: 10/07/22  Nutrition: PO diet  Glucose management: BS<100  Prophylaxis:               DVT: Needs               GI: n/a   PT/OT: n/a   GOC/Family discussion: full code      Plan:  -would add coreg, cont amlodipine and hydralazine  -would start enoxaparin for DVT prophylaxis  -check echo   -maintain I<O status     Critical care time (30min) spent personally by me on the following activities: development of treatment plan with patient or surrogate and bedside caregivers, discussions with consultants, evaluation of patient's response to treatment, examination of patient, ordering and performing treatments and interventions, ordering and review of laboratory studies, ordering and review of radiographic studies, pulse oximetry, re-evaluation of patient's condition. This critical care time did not overlap with that of any other provider or involve time for any procedures.

## 2022-10-08 NOTE — EICU
Intervention Initiated From:  Bedside    [place] Communicated with Bedside Nurse regarding:  Medication and Pain  Requesting tylenol order for h/a--pt on NTG.    Doctor Notified:  Yes  Comments: Dr. López

## 2022-10-08 NOTE — SUBJECTIVE & OBJECTIVE
History reviewed. No pertinent past medical history.    History reviewed. No pertinent surgical history.    Review of patient's allergies indicates:  No Known Allergies    Family History    None       Tobacco Use    Smoking status: Former    Smokeless tobacco: Never   Substance and Sexual Activity    Alcohol use: Yes     Comment: occasional    Drug use: No    Sexual activity: Not on file         Review of Systems   Constitutional:  Negative for activity change.   HENT:  Negative for sore throat.    Respiratory:  Negative for chest tightness and shortness of breath.    Gastrointestinal:  Negative for abdominal pain, nausea and vomiting.   Musculoskeletal:  Negative for back pain.   Skin:  Negative for wound.   Neurological:  Negative for dizziness and numbness.   Objective:     Vital Signs (Most Recent):  Temp: 98.2 °F (36.8 °C) (10/08/22 1115)  Pulse: 92 (10/08/22 1115)  Resp: (!) 31 (10/08/22 1115)  BP: (!) 181/95 (10/08/22 1100)  SpO2: (!) 94 % (10/08/22 1115)   Vital Signs (24h Range):  Temp:  [98 °F (36.7 °C)-98.7 °F (37.1 °C)] 98.2 °F (36.8 °C)  Pulse:  [] 92  Resp:  [12-45] 31  SpO2:  [91 %-99 %] 94 %  BP: (112-213)/() 181/95     Weight: 114 kg (251 lb 5.2 oz)  Body mass index is 38.21 kg/m².      Intake/Output Summary (Last 24 hours) at 10/8/2022 1133  Last data filed at 10/8/2022 1116  Gross per 24 hour   Intake 803.94 ml   Output 6525 ml   Net -5721.06 ml       Physical Exam  Constitutional:       Appearance: He is obese.   HENT:      Head: Normocephalic.      Right Ear: External ear normal.      Left Ear: External ear normal.      Nose: Nose normal.      Mouth/Throat:      Mouth: Mucous membranes are moist.   Eyes:      Extraocular Movements: Extraocular movements intact.      Pupils: Pupils are equal, round, and reactive to light.   Cardiovascular:      Rate and Rhythm: Normal rate and regular rhythm.      Pulses: Normal pulses.   Pulmonary:      Effort: Pulmonary effort is normal.    Abdominal:      Palpations: Abdomen is soft.   Musculoskeletal:         General: Normal range of motion.      Cervical back: Normal range of motion.      Right lower leg: No edema.      Left lower leg: No edema.   Skin:     General: Skin is warm.      Capillary Refill: Capillary refill takes less than 2 seconds.   Neurological:      General: No focal deficit present.      Mental Status: He is alert.   Psychiatric:         Mood and Affect: Mood normal.       Vents:       Lines/Drains/Airways       Peripheral Intravenous Line  Duration                  Peripheral IV - Single Lumen 10/07/22 1556 20 G Right Antecubital <1 day         Peripheral IV - Single Lumen 10/07/22 2304 22 G Anterior;Right Hand <1 day                    Significant Labs:    CBC/Anemia Profile:  Recent Labs   Lab 10/07/22  1139 10/08/22  0456   WBC 11.69 13.67*   HGB 11.4* 10.2*   HCT 36.2* 32.1*   * 456*   MCV 93 93   RDW 13.2 13.3   IRON  --  25*   FERRITIN  --  185        Chemistries:  Recent Labs   Lab 10/07/22  1139 10/08/22  0456    140   K 4.0 3.7    105   CO2 23 21*   BUN 23* 25*   CREATININE 2.2* 2.3*   CALCIUM 9.4 9.0   ALBUMIN 3.5 3.1*   PROT 6.9 6.3   BILITOT 0.7 0.7   ALKPHOS 78 64   ALT 27 23   AST 23 20   MG  --  2.1       BMP:   Recent Labs   Lab 10/08/22  0456   *      K 3.7      CO2 21*   BUN 25*   CREATININE 2.3*   CALCIUM 9.0   MG 2.1     CMP:   Recent Labs   Lab 10/07/22  1139 10/08/22  0456    140   K 4.0 3.7    105   CO2 23 21*   GLU 91 128*   BUN 23* 25*   CREATININE 2.2* 2.3*   CALCIUM 9.4 9.0   PROT 6.9 6.3   ALBUMIN 3.5 3.1*   BILITOT 0.7 0.7   ALKPHOS 78 64   AST 23 20   ALT 27 23   ANIONGAP 11 14     Cardiac Markers: No results for input(s): CKMB, TROPONINT, MYOGLOBIN in the last 48 hours.  Troponin:   Recent Labs   Lab 10/07/22  1139 10/07/22  1545 10/07/22  2121   TROPONINI 0.079* 0.095* 0.083*     Urine Studies:   Recent Labs   Lab 10/07/22  1315   COLORU Yellow    APPEARANCEUA Clear   PHUR 6.0   SPECGRAV 1.030   PROTEINUA 2+*   GLUCUA Trace*   KETONESU Negative   BILIRUBINUA Negative   OCCULTUA 1+*   NITRITE Negative   UROBILINOGEN Negative   LEUKOCYTESUR Negative   RBCUA 0   WBCUA 1   BACTERIA None   SQUAMEPITHEL 1   HYALINECASTS 3*       Significant Imaging:   I have reviewed all pertinent imaging results/findings within the past 24 hours.  CXR: I have reviewed all pertinent results/findings within the past 24 hours and my personal findings are:  Signs of cardiomegaly

## 2022-10-08 NOTE — CONSULTS
[facility] - Intensive Care  Pulmonology  Consult Note    Patient Name: Abe Pak  MRN: [id number]  Admission Date: 10/7/2022  Hospital Length of Stay: 1 days  Code Status: Full Code  Attending Physician: Nuzhat Patel MD  Primary Care Provider: Primary Doctor No   Principal Problem: Hypertensive emergency    Consults  Subjective:     HPI:  Patient admitted to the ICU for management of Chest pain with elevated troponin in setting of Hypertensive emergency. Patient was started on Nitroglycerin drip overnight and givne TID Hydralazine.      History reviewed. No pertinent past medical history.    History reviewed. No pertinent surgical history.    Review of patient's allergies indicates:  No Known Allergies    Family History    None       Tobacco Use    Smoking status: Former    Smokeless tobacco: Never   Substance and Sexual Activity    Alcohol use: Yes     Comment: occasional    Drug use: No    Sexual activity: Not on file         Review of Systems   Constitutional:  Negative for activity change.   HENT:  Negative for sore throat.    Respiratory:  Negative for chest tightness and shortness of breath.    Gastrointestinal:  Negative for abdominal pain, nausea and vomiting.   Musculoskeletal:  Negative for back pain.   Skin:  Negative for wound.   Neurological:  Negative for dizziness and numbness.   Objective:     Vital Signs (Most Recent):  Temp: 98.2 °F (36.8 °C) (10/08/22 1115)  Pulse: 92 (10/08/22 1115)  Resp: (!) 31 (10/08/22 1115)  BP: (!) 181/95 (10/08/22 1100)  SpO2: (!) 94 % (10/08/22 1115)   Vital Signs (24h Range):  Temp:  [98 °F (36.7 °C)-98.7 °F (37.1 °C)] 98.2 °F (36.8 °C)  Pulse:  [] 92  Resp:  [12-45] 31  SpO2:  [91 %-99 %] 94 %  BP: (112-213)/() 181/95     Weight: 114 kg (251 lb 5.2 oz)  Body mass index is 38.21 kg/m².      Intake/Output Summary (Last 24 hours) at 10/8/2022 1133  Last data filed at 10/8/2022 1116  Gross per 24 hour   Intake 803.94 ml   Output 6525 ml   Net -5721.06 ml        Physical Exam  Constitutional:       Appearance: He is obese.   HENT:      Head: Normocephalic.      Right Ear: External ear normal.      Left Ear: External ear normal.      Nose: Nose normal.      Mouth/Throat:      Mouth: Mucous membranes are moist.   Eyes:      Extraocular Movements: Extraocular movements intact.      Pupils: Pupils are equal, round, and reactive to light.   Cardiovascular:      Rate and Rhythm: Normal rate and regular rhythm.      Pulses: Normal pulses.   Pulmonary:      Effort: Pulmonary effort is normal.   Abdominal:      Palpations: Abdomen is soft.   Musculoskeletal:         General: Normal range of motion.      Cervical back: Normal range of motion.      Right lower leg: No edema.      Left lower leg: No edema.   Skin:     General: Skin is warm.      Capillary Refill: Capillary refill takes less than 2 seconds.   Neurological:      General: No focal deficit present.      Mental Status: He is alert.   Psychiatric:         Mood and Affect: Mood normal.       Vents:       Lines/Drains/Airways       Peripheral Intravenous Line  Duration                  Peripheral IV - Single Lumen 10/07/22 1556 20 G Right Antecubital <1 day         Peripheral IV - Single Lumen 10/07/22 2304 22 G Anterior;Right Hand <1 day                    Significant Labs:    CBC/Anemia Profile:  Recent Labs   Lab 10/07/22  1139 10/08/22  0456   WBC 11.69 13.67*   HGB 11.4* 10.2*   HCT 36.2* 32.1*   * 456*   MCV 93 93   RDW 13.2 13.3   IRON  --  25*   FERRITIN  --  185        Chemistries:  Recent Labs   Lab 10/07/22  1139 10/08/22  0456    140   K 4.0 3.7    105   CO2 23 21*   BUN 23* 25*   CREATININE 2.2* 2.3*   CALCIUM 9.4 9.0   ALBUMIN 3.5 3.1*   PROT 6.9 6.3   BILITOT 0.7 0.7   ALKPHOS 78 64   ALT 27 23   AST 23 20   MG  --  2.1       BMP:   Recent Labs   Lab 10/08/22  0456   *      K 3.7      CO2 21*   BUN 25*   CREATININE 2.3*   CALCIUM 9.0   MG 2.1     CMP:   Recent Labs    Lab 10/07/22  1139 10/08/22  0456    140   K 4.0 3.7    105   CO2 23 21*   GLU 91 128*   BUN 23* 25*   CREATININE 2.2* 2.3*   CALCIUM 9.4 9.0   PROT 6.9 6.3   ALBUMIN 3.5 3.1*   BILITOT 0.7 0.7   ALKPHOS 78 64   AST 23 20   ALT 27 23   ANIONGAP 11 14     Cardiac Markers: No results for input(s): CKMB, TROPONINT, MYOGLOBIN in the last 48 hours.  Troponin:   Recent Labs   Lab 10/07/22  1139 10/07/22  1545 10/07/22  2121   TROPONINI 0.079* 0.095* 0.083*     Urine Studies:   Recent Labs   Lab 10/07/22  1315   COLORU Yellow   APPEARANCEUA Clear   PHUR 6.0   SPECGRAV 1.030   PROTEINUA 2+*   GLUCUA Trace*   KETONESU Negative   BILIRUBINUA Negative   OCCULTUA 1+*   NITRITE Negative   UROBILINOGEN Negative   LEUKOCYTESUR Negative   RBCUA 0   WBCUA 1   BACTERIA None   SQUAMEPITHEL 1   HYALINECASTS 3*       Significant Imaging:   I have reviewed all pertinent imaging results/findings within the past 24 hours.  CXR: I have reviewed all pertinent results/findings within the past 24 hours and my personal findings are:  Signs of cardiomegaly   Assessment/Plan:     * Hypertensive emergency  Presented with Hypertension and chest pain  Recommendation  Start oral anti-hypertensive. Amlodipine 10mg  Add Coreg 6.25mg BID  Discontinued Nitro infusion  Increase dose of Hydralazine to achieve MAP goal  Patient potential for step down with improved BP, MAP goal 120  Consider work-up for causes of secondary hypertension     GILMER (acute kidney injury)  Possibly acute on chronic etiology  Recs  Monitor BUN/Crt  Replete fluids by mouth        Thank you for your consult. I will sign off. Please contact us if you have any additional questions.     Suleman Carnes MD  Pulmonology  [facility] - Intensive Care

## 2022-10-08 NOTE — HPI
Patient admitted to the ICU for management of Chest pain with elevated troponin in setting of Hypertensive emergency. Patient was started on Nitroglycerin drip overnight and givne TID Hydralazine.

## 2022-10-08 NOTE — PLAN OF CARE
Patient continues on nitro drip now at 20mcg. MD came by and stated the keep the map between 135-150. Drip off for a while and had to be restarted. Patient denies pain this shift and is tolerating food. He voided 1300ml clear yellow urine in urinal. Mag sulfate 2gm given IV. Plan of care discussed with patient and instructed to call for assistance to get OOB. PO hydralazine given last night and he will start on amlodipine this am.

## 2022-10-08 NOTE — ASSESSMENT & PLAN NOTE
Presented with Hypertension and chest pain  Recommendation  Start oral anti-hypertensive. Amlodipine 10mg  Add Coreg 6.25mg BID  Discontinued Nitro infusion  Increase dose of Hydralazine to achieve MAP goal  Patient potential for step down with improved BP, MAP goal 120  Consider work-up for causes of secondary hypertension

## 2022-10-08 NOTE — PROGRESS NOTES
"[facility] Medicine Progress Note    Primary Team: [facility] Hospitalist Team A  Attending Physician: Nuzhat Patel MD  Resident: Brandt  Intern: Jg    Subjective:      Overnight patient complaining of headache, received tylenol and symptoms improved. This morning patient feels improved, but not back to baseline yet. On 3.5L NC, pt denies any new symptoms. UOP 3.8L yesterday     Objective:     Last 24 Hour Vital Signs:  BP  Min: 112/65  Max: 213/138  Temp  Av.4 °F (36.9 °C)  Min: 98 °F (36.7 °C)  Max: 99 °F (37.2 °C)  Pulse  Av.5  Min: 93  Max: 121  Resp  Av.1  Min: 20  Max: 45  SpO2  Av.8 %  Min: 91 %  Max: 100 %  Height  Av' 7.5" (171.5 cm)  Min: 5' 7" (170.2 cm)  Max: 5' 8" (172.7 cm)  Weight  Av kg (255 lb 10.6 oz)  Min: 114 kg (251 lb 5.2 oz)  Max: 117.9 kg (260 lb)  I/O last 3 completed shifts:  In: 295.4 [P.O.:120; I.V.:175.4]  Out: 3825 [Urine:3825]    Physical Examination:  Physical Exam  Constitutional:       Appearance: Normal appearance.   HENT:      Head: Atraumatic.      Mouth/Throat:      Mouth: Mucous membranes are moist.   Eyes:      Extraocular Movements: Extraocular movements intact.      Conjunctiva/sclera: Conjunctivae normal.   Cardiovascular:      Rate and Rhythm: Regular rhythm. Tachycardia present.   Pulmonary:      Effort: Pulmonary effort is normal. No respiratory distress.      Breath sounds: No rales.   Abdominal:      Palpations: Abdomen is soft.      Tenderness: There is no abdominal tenderness.   Musculoskeletal:      Right lower leg: Edema (1+) present.      Left lower leg: Edema (1+) present.   Neurological:      General: No focal deficit present.      Mental Status: He is alert and oriented to person, place, and time.       Laboratory:  Recent Labs   Lab 10/07/22  1139 10/08/22  0456   WBC 11.69 13.67*   HGB 11.4* 10.2*   HCT 36.2* 32.1*   * 456*   MCV 93 93   RDW 13.2 13.3     --    K 4.0  --      --    CO2 23  --    BUN 23*  " --    CREATININE 2.2*  --    GLU 91  --    PROT 6.9  --    ALBUMIN 3.5  --    BILITOT 0.7  --    AST 23  --    ALKPHOS 78  --    ALT 27  --      Microbiology Data Reviewed: No new micro    Other Results:  EKG: No new EKG    Radiology: No new imaging    Current Medications:     Infusions:   nitroGLYCERIN 10 mcg/min (10/08/22 0600)        Scheduled:   amLODIPine  5 mg Oral Daily    aspirin  325 mg Oral Daily    furosemide (LASIX) injection  80 mg Intravenous Once    hydrALAZINE  25 mg Oral Once    hydrALAZINE  50 mg Oral Q8H    mupirocin   Nasal BID        PRN:  influenza, pneumoc 20-haile conj-dip cr(PF), sars-cov-2 (covid-19), sodium chloride 0.9%    Antibiotics and Day Number of Therapy:  None    Lines and Day Number of Therapy:  PIV 10/7/22    Assessment:      Abe Pak is a 41 y.o. male with no past medical history who presents for shortness of breath, and chest tightness for 2 weeks. Patient's Bp was in the 200s/130s in the ED and labetalol, lasix, and metoprolol was administered. Patient admitted to ICU on nitro drip for hypertensive emergency. Patient clinically improving, although Bps remain in the 200s systolic.     Plan:      Hypertensive emergency  Chest pain with elevated troponin  - SOB, CP worsening for 2 weeks  - In the ED patient's Bp was in the 200s/130s  Given labetalol, lasix, and metoprolol with improvement in symptoms  - Troponin 0.079>>0.095>>0.083    -   -   - EKG Sinus tachycardia, R axis deviation, LVH with secondary ST T wave changes  - CXR concerning for PNA vs aspiration R>L, no pleural effusion, enlarged cardiac sillouette  - Hydralazine 25mg q8h, furosemide 80mg IV, amlodipine 5mg qd  - TTE ordered  - Nitroglycerin gtt, titrated by ICU     Normocytic Anemia  - H/H 11.4/36.2 MCV 93  - Iron panel, ferritin pending  - CTM with CBC     GILMER  - BUN 23 Cr 2.2 baseline ~1  - FeNa 0.4% FeUrea >35% prerenal etiology  - Patient appears hypervolemic on exam JVP 8-9, CXR with  patchy opacities, GILMER possibly from HF etiology  - Follow up CMP     Healthcare Maintenance  - Flu, Prevnar, Covid shots prior to discharge  - HbA1c  - Lipid panel  - Establish with PCP    Diet: Cardiac  DVT Prophylaxis: SCD  IVF: None  Code: Full    Dispo: Pending control of blood pressure    Elle Gregorio MD  [facility] IM PGY1     [facility] Medicine Hospitalist Pager numbers:   [facility] Hospitalist Medicine Team A (Jorge/Leon): [phone]  [facility] Hospitalist Medicine Team B (Aimee/Noel):  [phone]

## 2022-10-09 PROBLEM — R79.89 ELEVATED TROPONIN: Status: RESOLVED | Noted: 2022-10-07 | Resolved: 2022-10-09

## 2022-10-09 LAB
ALBUMIN SERPL BCP-MCNC: 3.4 G/DL (ref 3.5–5.2)
ALP SERPL-CCNC: 77 U/L (ref 55–135)
ALT SERPL W/O P-5'-P-CCNC: 30 U/L (ref 10–44)
ANION GAP SERPL CALC-SCNC: 16 MMOL/L (ref 8–16)
AST SERPL-CCNC: 23 U/L (ref 10–40)
BASOPHILS # BLD AUTO: 0.01 K/UL (ref 0–0.2)
BASOPHILS NFR BLD: 0.1 % (ref 0–1.9)
BILIRUB SERPL-MCNC: 0.9 MG/DL (ref 0.1–1)
BUN SERPL-MCNC: 31 MG/DL (ref 6–20)
CALCIUM SERPL-MCNC: 9.9 MG/DL (ref 8.7–10.5)
CHLORIDE SERPL-SCNC: 99 MMOL/L (ref 95–110)
CO2 SERPL-SCNC: 24 MMOL/L (ref 23–29)
CORTIS SERPL-MCNC: 19.9 UG/DL
CREAT SERPL-MCNC: 2.5 MG/DL (ref 0.5–1.4)
DIFFERENTIAL METHOD: ABNORMAL
EOSINOPHIL # BLD AUTO: 0 K/UL (ref 0–0.5)
EOSINOPHIL NFR BLD: 0.3 % (ref 0–8)
ERYTHROCYTE [DISTWIDTH] IN BLOOD BY AUTOMATED COUNT: 13.3 % (ref 11.5–14.5)
EST. GFR  (NO RACE VARIABLE): 32 ML/MIN/1.73 M^2
GLUCOSE SERPL-MCNC: 128 MG/DL (ref 70–110)
HCT VFR BLD AUTO: 35.9 % (ref 40–54)
HGB BLD-MCNC: 11.4 G/DL (ref 14–18)
IMM GRANULOCYTES # BLD AUTO: 0.06 K/UL (ref 0–0.04)
IMM GRANULOCYTES NFR BLD AUTO: 0.4 % (ref 0–0.5)
LYMPHOCYTES # BLD AUTO: 1.2 K/UL (ref 1–4.8)
LYMPHOCYTES NFR BLD: 8.3 % (ref 18–48)
MCH RBC QN AUTO: 28.6 PG (ref 27–31)
MCHC RBC AUTO-ENTMCNC: 31.8 G/DL (ref 32–36)
MCV RBC AUTO: 90 FL (ref 82–98)
MONOCYTES # BLD AUTO: 0.8 K/UL (ref 0.3–1)
MONOCYTES NFR BLD: 5.5 % (ref 4–15)
NEUTROPHILS # BLD AUTO: 11.9 K/UL (ref 1.8–7.7)
NEUTROPHILS NFR BLD: 85.4 % (ref 38–73)
NRBC BLD-RTO: 0 /100 WBC
PLATELET # BLD AUTO: 487 K/UL (ref 150–450)
PMV BLD AUTO: 8.5 FL (ref 9.2–12.9)
POTASSIUM SERPL-SCNC: 3.3 MMOL/L (ref 3.5–5.1)
PROT SERPL-MCNC: 7.2 G/DL (ref 6–8.4)
RBC # BLD AUTO: 3.98 M/UL (ref 4.6–6.2)
SODIUM SERPL-SCNC: 139 MMOL/L (ref 136–145)
WBC # BLD AUTO: 13.93 K/UL (ref 3.9–12.7)

## 2022-10-09 PROCEDURE — 25000003 PHARM REV CODE 250: Performed by: STUDENT IN AN ORGANIZED HEALTH CARE EDUCATION/TRAINING PROGRAM

## 2022-10-09 PROCEDURE — 85025 COMPLETE CBC W/AUTO DIFF WBC: CPT | Performed by: STUDENT IN AN ORGANIZED HEALTH CARE EDUCATION/TRAINING PROGRAM

## 2022-10-09 PROCEDURE — 63600175 PHARM REV CODE 636 W HCPCS: Performed by: STUDENT IN AN ORGANIZED HEALTH CARE EDUCATION/TRAINING PROGRAM

## 2022-10-09 PROCEDURE — 99223 PR INITIAL HOSPITAL CARE,LEVL III: ICD-10-PCS | Mod: ,,, | Performed by: INTERNAL MEDICINE

## 2022-10-09 PROCEDURE — 27000221 HC OXYGEN, UP TO 24 HOURS

## 2022-10-09 PROCEDURE — 94761 N-INVAS EAR/PLS OXIMETRY MLT: CPT

## 2022-10-09 PROCEDURE — 36415 COLL VENOUS BLD VENIPUNCTURE: CPT | Performed by: STUDENT IN AN ORGANIZED HEALTH CARE EDUCATION/TRAINING PROGRAM

## 2022-10-09 PROCEDURE — 82533 TOTAL CORTISOL: CPT | Performed by: STUDENT IN AN ORGANIZED HEALTH CARE EDUCATION/TRAINING PROGRAM

## 2022-10-09 PROCEDURE — 84244 ASSAY OF RENIN: CPT | Performed by: STUDENT IN AN ORGANIZED HEALTH CARE EDUCATION/TRAINING PROGRAM

## 2022-10-09 PROCEDURE — 25000003 PHARM REV CODE 250

## 2022-10-09 PROCEDURE — 11000001 HC ACUTE MED/SURG PRIVATE ROOM

## 2022-10-09 PROCEDURE — 80053 COMPREHEN METABOLIC PANEL: CPT | Performed by: STUDENT IN AN ORGANIZED HEALTH CARE EDUCATION/TRAINING PROGRAM

## 2022-10-09 PROCEDURE — 63600175 PHARM REV CODE 636 W HCPCS

## 2022-10-09 PROCEDURE — 99223 1ST HOSP IP/OBS HIGH 75: CPT | Mod: ,,, | Performed by: INTERNAL MEDICINE

## 2022-10-09 PROCEDURE — 99900035 HC TECH TIME PER 15 MIN (STAT)

## 2022-10-09 RX ORDER — FUROSEMIDE 10 MG/ML
80 INJECTION INTRAMUSCULAR; INTRAVENOUS ONCE
Status: COMPLETED | OUTPATIENT
Start: 2022-10-09 | End: 2022-10-09

## 2022-10-09 RX ORDER — VALSARTAN 40 MG/1
40 TABLET ORAL 2 TIMES DAILY
Status: DISCONTINUED | OUTPATIENT
Start: 2022-10-09 | End: 2022-10-10 | Stop reason: HOSPADM

## 2022-10-09 RX ORDER — CARVEDILOL 6.25 MG/1
6.25 TABLET ORAL 2 TIMES DAILY
Status: DISCONTINUED | OUTPATIENT
Start: 2022-10-09 | End: 2022-10-10 | Stop reason: HOSPADM

## 2022-10-09 RX ORDER — VALSARTAN 40 MG/1
40 TABLET ORAL DAILY
Status: DISCONTINUED | OUTPATIENT
Start: 2022-10-09 | End: 2022-10-09

## 2022-10-09 RX ORDER — CARVEDILOL 3.12 MG/1
6.25 TABLET ORAL 2 TIMES DAILY
Status: DISCONTINUED | OUTPATIENT
Start: 2022-10-09 | End: 2022-10-09

## 2022-10-09 RX ORDER — CARVEDILOL 3.12 MG/1
3.12 TABLET ORAL 2 TIMES DAILY
Status: DISCONTINUED | OUTPATIENT
Start: 2022-10-09 | End: 2022-10-09

## 2022-10-09 RX ORDER — ACETAMINOPHEN 500 MG
1000 TABLET ORAL ONCE
Status: COMPLETED | OUTPATIENT
Start: 2022-10-09 | End: 2022-10-09

## 2022-10-09 RX ADMIN — CARVEDILOL 6.25 MG: 6.25 TABLET, FILM COATED ORAL at 08:10

## 2022-10-09 RX ADMIN — ASPIRIN 325 MG: 325 TABLET ORAL at 08:10

## 2022-10-09 RX ADMIN — MUPIROCIN: 20 OINTMENT TOPICAL at 08:10

## 2022-10-09 RX ADMIN — POTASSIUM BICARBONATE 50 MEQ: 977.5 TABLET, EFFERVESCENT ORAL at 08:10

## 2022-10-09 RX ADMIN — HYDRALAZINE HYDROCHLORIDE 100 MG: 25 TABLET, FILM COATED ORAL at 02:10

## 2022-10-09 RX ADMIN — HYDRALAZINE HYDROCHLORIDE 100 MG: 25 TABLET, FILM COATED ORAL at 09:10

## 2022-10-09 RX ADMIN — POTASSIUM BICARBONATE 50 MEQ: 977.5 TABLET, EFFERVESCENT ORAL at 10:10

## 2022-10-09 RX ADMIN — ACETAMINOPHEN 1000 MG: 500 TABLET ORAL at 03:10

## 2022-10-09 RX ADMIN — FUROSEMIDE 80 MG: 10 INJECTION, SOLUTION INTRAMUSCULAR; INTRAVENOUS at 05:10

## 2022-10-09 RX ADMIN — ENOXAPARIN SODIUM 40 MG: 100 INJECTION SUBCUTANEOUS at 05:10

## 2022-10-09 RX ADMIN — MUPIROCIN: 20 OINTMENT TOPICAL at 09:10

## 2022-10-09 RX ADMIN — AMLODIPINE BESYLATE 10 MG: 5 TABLET ORAL at 08:10

## 2022-10-09 RX ADMIN — CARVEDILOL 3.12 MG: 3.12 TABLET, FILM COATED ORAL at 08:10

## 2022-10-09 RX ADMIN — HYDRALAZINE HYDROCHLORIDE 100 MG: 25 TABLET, FILM COATED ORAL at 06:10

## 2022-10-09 RX ADMIN — VALSARTAN 40 MG: 40 TABLET, FILM COATED ORAL at 08:10

## 2022-10-09 RX ADMIN — VALSARTAN 40 MG: 40 TABLET, FILM COATED ORAL at 12:10

## 2022-10-09 NOTE — PROGRESS NOTES
"[facility] Medicine Progress Note    Primary Team: [facility] Hospitalist Team A  Attending Physician: Nuzhat Patel MD  Resident: Nicolas  Intern: Moni    Subjective:      No acute overnight events. Pt states he is doing well. He is receiving both nitro ggt and oral antihypertensives. Denies HA, CP, SOB, nausea, vomiting, or palpitations. States he is still making good urine. He denies any other medical concerns at this time.     Objective:     Last 24 Hour Vital Signs:  BP  Min: 125/91  Max: 214/128  Temp  Av.5 °F (36.9 °C)  Min: 98.2 °F (36.8 °C)  Max: 99 °F (37.2 °C)  Pulse  Av.7  Min: 85  Max: 112  Resp  Av.2  Min: 6  Max: 55  SpO2  Av.9 %  Min: 88 %  Max: 100 %  Height  Av' 8" (172.7 cm)  Min: 5' 8" (172.7 cm)  Max: 5' 8" (172.7 cm)  Weight  Av.2 kg (245 lb 1.6 oz)  Min: 108.5 kg (239 lb 3.2 oz)  Max: 113.9 kg (251 lb)  I/O last 3 completed shifts:  In: 1418.1 [P.O.:1230; I.V.:188.1]  Out: 7875 [Urine:7875]    Physical Examination:  Gen: WDWN male, NAD, resting comfortably in bed  HEENT: NC/AT, EOMI grossly, normal external ears bilaterally, normal external nose, moist MM  Neck: Supple  CV: Regular rate, regular rhythm, no MRG, 2+ radial pulses bilaterally  Resp: CTAB. No wheezes, rales, rhonchi. Normal work of breathing. Normal effort. Equal chest rise. No respiratory distress  Abd: Soft, nontender, nondistended, no rebound/guarding  MSK/Ext: No clubbing, cyanosis, or edema. Moves all four extremities spontaneously  Neuro: GCS 15, alert, speech is normal, face symmetric, no focal motor deficits elicited on exam  Skin: Warm, dry, no rash  Psych: Normal mood and affect     Laboratory:  Recent Labs   Lab 10/07/22  1139 10/08/22  0456 10/09/22  0407   WBC 11.69 13.67* 13.93*   HGB 11.4* 10.2* 11.4*   HCT 36.2* 32.1* 35.9*   * 456* 487*   MCV 93 93 90   RDW 13.2 13.3 13.3    140 139   K 4.0 3.7 3.3*    105 99   CO2 23 21* 24   BUN 23* 25* 31*   CREATININE 2.2* 2.3* " 2.5*   GLU 91 128* 128*   PROT 6.9 6.3 7.2   ALBUMIN 3.5 3.1* 3.4*   BILITOT 0.7 0.7 0.9   AST 23 20 23   ALKPHOS 78 64 77   ALT 27 23 30       Microbiology Data Reviewed: No new micro    Other Results:  EKG: No new EKG  Echo:  The left ventricle is moderately enlarged with moderate concentric hypertrophy and severely decreased systolic function.  The estimated ejection fraction is 30%.  There is severe left ventricular global hypokinesis.  Mild mitral regurgitation.  Mild tricuspid regurgitation.  Mild pulmonic regurgitation.  Normal right ventricular size with normal right ventricular systolic function.  There is no pulmonary hypertension.  Small anterior pericardial effusion.    Radiology: No new imaging    Current Medications:     Infusions:   nitroGLYCERIN 10 mcg/min (10/09/22 0641)        Scheduled:   amLODIPine  10 mg Oral Daily    aspirin  325 mg Oral Daily    carvediloL  3.125 mg Oral BID    enoxaparin  40 mg Subcutaneous Daily    hydrALAZINE  100 mg Oral Q8H    mupirocin   Nasal BID        PRN:  influenza, pneumoc 20-haile conj-dip cr(PF), sars-cov-2 (covid-19), sodium chloride 0.9%    Antibiotics and Day Number of Therapy:  None    Lines and Day Number of Therapy:  PIV 10/7/22    Assessment:      Abe Pak is a 41 y.o. male with no past medical history who presents for shortness of breath, and chest tightness for 2 weeks. Patient's Bp was in the 200s/130s in the ED and labetalol, lasix, and metoprolol was administered. Patient admitted to ICU on nitro drip for hypertensive emergency. Patient clinically improving, although Bps remain in the 200s systolic.     Plan:      Hypertensive emergency  Chest pain with elevated troponin  - SOB, CP worsening for 2 weeks  - In the ED patient's Bp was in the 200s/130s  Given labetalol, lasix, and metoprolol with improvement in symptoms  - Troponin 0.079>>0.095>>0.083    -   -   - EKG Sinus tachycardia, R axis deviation, LVH with secondary ST T wave  changes  - CXR concerning for PNA vs aspiration R>L, no pleural effusion, enlarged cardiac sillouette  - Echo revealed global LV hypokinesis with EF 30%  - Continue nitro ggt, wean as able  - Hydralazine 100 mg q8h, coreg 3.125 BID, Norvasc 10 daily    Heart failure with reduced EF, new diagnosis  - Echo revealed EF 30% and global LV hypokinesis with severely decreased systolic function  - BP control as noted above  - Consult cardiology  -  daily, consider starting ACE on discharge     Normocytic Anemia, stable  - H/H 11.4/36.2 MCV 93  - TIBC WNL, saturation 7% with a low iron  - Ferritin normal 185  - CTM with CBC     GILMER  - BUN 23 Cr 2.2 baseline ~1  - FeNa 0.4% FeUrea >35% prerenal etiology  - Patient appears hypervolemic on exam JVP 8-9, CXR with patchy opacities, GILMER possibly from HF etiology  - Follow up CMP     Healthcare Maintenance  - Flu, Prevnar, Covid shots prior to discharge  - HbA1c  - Lipid panel  - Establish with PCP    Diet: Cardiac  DVT Prophylaxis: lovenox  IVF: None  Code: Full    Dispo: Pending control of blood pressure    Shady Montenegro DO  [facility] Internal Medicine, HO-1     [facility] Medicine Hospitalist Pager numbers:   [facility] Hospitalist Medicine Team A (Jorge/Leon): [phone]  [facility] Hospitalist Medicine Team B (Aimee/Noel):  [phone]

## 2022-10-09 NOTE — PLAN OF CARE
1500 bp 170/104    1530  bp sitting 189/127 pulse 109 st, no co of dizziness, or weakness,   Standing bp 180/105 pulse 108 st, no co of dizziness,   Ambulated in room, without difficulty, then up in chair, will see next bp

## 2022-10-09 NOTE — PLAN OF CARE
Transferred to [facility] via wheelchair, phone and , with him, nurse Lopez at bedside

## 2022-10-09 NOTE — CONSULTS
"[facility]   Consult   Consult Requested By:   Reason for Consult: chest pains    SUBJECTIVE:     History of Present Illness:  Patient is a 41 y.o. male presents with no real cardiac history. He has not seen a primary and saw someone in [facility] for a tooth abscess??. In any case, he does not have a history of hypertension but never checked his blood pressure. He works as a vehicle detail person and has had no symptoms prior to 2 weeks ago when he noted chest pains - describes it as a heaviness in the left chest but only "if he moves". He states if he moved his upper body, he would feel pain. On and off for the last two weeks. None with deep breathing; no pain with lifting his arms above his head. Vague regarding exertional component. He really did not notice any shortness of breath of concern but the pain increased in intensity and he came to the ER where his blood pressure was over 200.       ROS    Review of patient's allergies indicates:  No Known Allergies    History reviewed. No pertinent past medical history.    History reviewed. No pertinent surgical history.    History reviewed. No pertinent family history.    Social History     Tobacco Use    Smoking status: Former    Smokeless tobacco: Never   Substance Use Topics    Alcohol use: Yes     Comment: occasional    Drug use: No        Home meds:  No current facility-administered medications on file prior to encounter.     No current outpatient medications on file prior to encounter.       Current meds:    Scheduled Meds:   amLODIPine  10 mg Oral Daily    aspirin  325 mg Oral Daily    carvediloL  3.125 mg Oral BID    enoxaparin  40 mg Subcutaneous Daily    hydrALAZINE  100 mg Oral Q8H    mupirocin   Nasal BID     Continuous Infusions:   nitroGLYCERIN 10 mcg/min (10/09/22 0641)     PRN Meds:.influenza, pneumoc 20-haile conj-dip cr(PF), sars-cov-2 (covid-19), sodium chloride 0.9%        OBJECTIVE:     Vital Signs (Most Recent)  Vitals:    " 10/09/22 0630 10/09/22 0645 10/09/22 0742 10/09/22 0856   BP: (!) 194/119 (!) 189/119  (!) 156/84   Pulse: 85 92 97 98   Resp: (!) 25 (!) 22 (!) 23    Temp:       TempSrc:       SpO2: (!) 94% 96% 96%    Weight:       Height:             Physical Exam:  Neck: normal carotids, no bruits; normal JVP  Lungs :clear  Heart: RR, normal S1,S2, no murmurs, no gallops  Abd: no masses; no bruits;   Exts: normal DP and PT pulses bilaterally, no edema noted           LABS      CBC  Hemoglobin (g/dL)   Date Value   10/09/2022 11.4 (L)   10/08/2022 10.2 (L)   10/07/2022 11.4 (L)     Hematocrit (%)   Date Value   10/09/2022 35.9 (L)   10/08/2022 32.1 (L)   10/07/2022 36.2 (L)          BMP  Sodium (mmol/L)   Date Value   10/09/2022 139   10/08/2022 140   10/07/2022 141     Potassium (mmol/L)   Date Value   10/09/2022 3.3 (L)   10/08/2022 3.7   10/07/2022 4.0     CO2 (mmol/L)   Date Value   10/09/2022 24   10/08/2022 21 (L)   10/07/2022 23     Chloride (mmol/L)   Date Value   10/09/2022 99   10/08/2022 105   10/07/2022 107     BUN (mg/dL)   Date Value   10/09/2022 31 (H)   10/08/2022 25 (H)   10/07/2022 23 (H)     Creatinine (mg/dL)   Date Value   10/09/2022 2.5 (H)   10/08/2022 2.3 (H)   10/07/2022 2.2 (H)     Glucose (mg/dL)   Date Value   10/09/2022 128 (H)   10/08/2022 128 (H)   10/07/2022 91       BNP  BNP (pg/mL)   Date Value   10/07/2022 869 (H)       Troponin panel:   No results found for: TROPONIN      COAGS  No results found for: PT, INR, APTT    Lipid panel:    Lab Results   Component Value Date    CHOL 149 10/08/2022     Lab Results   Component Value Date    HDL 41 10/08/2022     Lab Results   Component Value Date    LDLCALC 92.6 10/08/2022     Lab Results   Component Value Date    TRIG 77 10/08/2022     Lab Results   Component Value Date    CHOLHDL 27.5 10/08/2022       Diagnostic Results:    EKG: 10/7/22: sinus; nonspecific T wave changes   CXR:10/22: increased heart side; right sided infiltrate   Echo: bedside by me:  10/9/22: severe LVH; normal valves; EF around 30%  US renal arteries: negative for renal artery stenosis   Chart review:        ASSESSMENT/PLAN:     Severe hypertension on admission with no definite EKG changes of NSTEMI; abnormal troponin most likely due to demand ischemia. Severe LVH on echo done at bedside. Right sided infiltrate - is this just fluid? No evidence of infection so far  2. CKD with GFR 32   Plan: 1. Agree with intense management of his hypertension - currently on amlodipine, ASA, carvedilol and hydralazine  2. Eventually, he will need an ischemic workup which I expect to be negative  3. He has no primary and needs one prior to discharge  4. I will be glad to followup as outpatient if you wish       Arvind Paul MD

## 2022-10-09 NOTE — PLAN OF CARE
Bp 139/90 now 150/108 pt will transfer to the floor 428, has no belongings with him , except phone and

## 2022-10-09 NOTE — PLAN OF CARE
Pt awakened for assessment, weaning ntg and 02 as johnnie, denies headache, chest pain or sob, no cramping as last night, , small amt courseness rt post base, instructed in taking deeper breaths, and coughing, does clear somewhat past coughing,     Will notify md of k 3.3

## 2022-10-09 NOTE — NURSING
Had to resume NTG gtt last night for HTN. B/p improved with this and oral med. Note new b/p meds to be started this am. Weaning off gtt as appropriate.

## 2022-10-09 NOTE — PROGRESS NOTES
Pulmonary/Critical Care    Patient seen and examined by me. I agree with the trainee's separate note except as modified.    Major overnight events: Nitro gtt restarted     Vitals: Afebrile, HR 80-110s, -140s, 97% on RA     Significant lab findings: WBC 13, creatinine stable at 2.5. K 3.3. bicarb 24.     TTE: LV moderately enlarged with LVEF 30%. Mild MR and TR. Normal RV. Small anterior pericardial effusion.     Key exam findings: breathing comfortably on room air     Sedation: none  Vasopressors: none  Other drips: nitro off  Lines and invasive devices: none  Fluid Balance: 24 hr: -5.3L   Overall: -8.8L  Creatinine: 2.5   Antibiotics/Day #: none indicated   Glucose management: BS low 100s  Prophylaxis:               DVT: enoxaparin              GI: n/a   PT/OT: n/a   GOC/Family discussion: full code     Plan:  -cont to uptitrate oral anti-hypertensives. Consider addition of ARB as renal function appears to be stable  -would not re-start nitro gtt if his BP goes up since he is not having active chest pain and this is not a particularly effective anti-hypertensive  -cont enoxaparin   -OK to progress out of the ICU from our perspective    Critical care time (30min) spent personally by me on the following activities: development of treatment plan with patient or surrogate and bedside caregivers, discussions with consultants, evaluation of patient's response to treatment, examination of patient, ordering and performing treatments and interventions, ordering and review of laboratory studies, ordering and review of radiographic studies, pulse oximetry, re-evaluation of patient's condition. This critical care time did not overlap with that of any other provider or involve time for any procedures.

## 2022-10-09 NOTE — PLAN OF CARE
Pt continues to have periods of high bp, is asymptomatic, additional bp meds added, along with dose of lasix 80 mgm ivp, pt needs to have diet instructions, prior to going home, he is to inc activity , walking. Pt instructed to notify nurse of any chest pain or sob, understands

## 2022-10-09 NOTE — PLAN OF CARE
Notified Dr Joshi of previous bld pressures and bp now 162/100, sinus tach of 102, pt asymptomatic, states pt may go to floor, and that he will review meds

## 2022-10-09 NOTE — PROGRESS NOTES
[facility] Medicine Progress Note    Primary Team: [facility] Hospitalist Team A  Attending Physician: Nuzhat Patel MD  Resident: Nicolas  Intern: Moni    Subjective:      No acute overnight events. Pt states he is doing well. Off nitroglycerin since 8am this morning. Denies HA, CP, SOB, nausea, vomiting, or palpitations. Urinating well.    Objective:     Last 24 Hour Vital Signs:  BP  Min: 125/91  Max: 214/128  Temp  Av.5 °F (36.9 °C)  Min: 98.2 °F (36.8 °C)  Max: 99 °F (37.2 °C)  Pulse  Av.6  Min: 85  Max: 112  Resp  Av.2  Min: 6  Max: 55  SpO2  Av.1 %  Min: 88 %  Max: 100 %  Weight  Av.5 kg (239 lb 3.2 oz)  Min: 108.5 kg (239 lb 3.2 oz)  Max: 108.5 kg (239 lb 3.2 oz)  I/O last 3 completed shifts:  In: 1418.1 [P.O.:1230; I.V.:188.1]  Out: 7875 [Urine:7875]    Physical Examination:  Gen: WDWN male, NAD, resting comfortably in bed  HEENT: NC/AT, EOMI grossly, normal external ears bilaterally, normal external nose, moist MM  Neck: Supple  CV: Regular rate, regular rhythm, no MRG, 2+ radial pulses bilaterally  Resp: CTAB. No wheezes, rales, rhonchi. Normal work of breathing. Normal effort. Equal chest rise. No respiratory distress  Abd: Soft, nontender, nondistended, no rebound/guarding  MSK/Ext: No clubbing, cyanosis, or edema. Moves all four extremities spontaneously  Neuro: GCS 15, alert, speech is normal, face symmetric, no focal motor deficits elicited on exam  Skin: Warm, dry, no rash  Psych: Normal mood and affect     Laboratory:  Recent Labs   Lab 10/07/22  1139 10/08/22  0456 10/09/22  0407   WBC 11.69 13.67* 13.93*   HGB 11.4* 10.2* 11.4*   HCT 36.2* 32.1* 35.9*   * 456* 487*   MCV 93 93 90   RDW 13.2 13.3 13.3    140 139   K 4.0 3.7 3.3*    105 99   CO2 23 21* 24   BUN 23* 25* 31*   CREATININE 2.2* 2.3* 2.5*   GLU 91 128* 128*   PROT 6.9 6.3 7.2   ALBUMIN 3.5 3.1* 3.4*   BILITOT 0.7 0.7 0.9   AST 23 20 23   ALKPHOS 78 64 77   ALT 27 23 30     Microbiology Data  Reviewed: No new micro    Other Results:  EKG: No new EKG  Echo:  The left ventricle is moderately enlarged with moderate concentric hypertrophy and severely decreased systolic function.  The estimated ejection fraction is 30%.  There is severe left ventricular global hypokinesis.  Mild mitral regurgitation.  Mild tricuspid regurgitation.  Mild pulmonic regurgitation.  Normal right ventricular size with normal right ventricular systolic function.  There is no pulmonary hypertension.  Small anterior pericardial effusion.    Radiology: No new imaging    Current Medications:     Infusions:   nitroGLYCERIN Stopped (10/09/22 0950)        Scheduled:   amLODIPine  10 mg Oral Daily    aspirin  325 mg Oral Daily    carvediloL  3.125 mg Oral BID    enoxaparin  40 mg Subcutaneous Daily    hydrALAZINE  100 mg Oral Q8H    mupirocin   Nasal BID    potassium bicarbonate  50 mEq Oral BID    valsartan  40 mg Oral Daily        PRN:  influenza, pneumoc 20-haile conj-dip cr(PF), sars-cov-2 (covid-19), sodium chloride 0.9%    Antibiotics and Day Number of Therapy:  None    Lines and Day Number of Therapy:  PIV 10/7/22    Assessment:      Abe Pak is a 41 y.o. male with no past medical history who presents for shortness of breath, and chest tightness for 2 weeks. Pt admitted to the ICU for hypertensive emergency with GILMER and newly reduced EF. BP improved with po meds, now off nitro ggt.      Plan:      Hypertensive emergency  Chest pain with elevated troponin  - SOB, CP worsening for 2 weeks  - Echo revealed global LV hypokinesis with EF 30%  - Continue Hydralazine 100 mg q8h, Norvasc 10 daily  - increase coreg as tolerated  - add ARB as renal function appears stable   - Would not restart nitro ggt unless the patient is having active chest pain. This is not an effective BP med.   - continue enoxaparin    Heart failure with reduced EF, new diagnosis  - Echo revealed EF 30% and global LV hypokinesis with severely decreased systolic  function  - BP control as noted above  - Cardiology evaluated and recommending outpatient ischemic eval.   - asa daily     GILMER  - Cr 2.5 baseline ~1  - renal studies  - continue to follow    Diet: Cardiac, renal  DVT Prophylaxis: lovenox  IVF: None  Code: Full    Dispo: okay to transition out of ICU from our perspective.     Estelle Turk MD  Emergency Medicine Staff   Critical Care Fellow  12:46 PM

## 2022-10-10 VITALS
HEART RATE: 84 BPM | RESPIRATION RATE: 20 BRPM | BODY MASS INDEX: 36.25 KG/M2 | DIASTOLIC BLOOD PRESSURE: 82 MMHG | SYSTOLIC BLOOD PRESSURE: 127 MMHG | WEIGHT: 239.19 LBS | TEMPERATURE: 97 F | HEIGHT: 68 IN | OXYGEN SATURATION: 97 %

## 2022-10-10 PROBLEM — N18.32 STAGE 3B CHRONIC KIDNEY DISEASE (CKD): Status: ACTIVE | Noted: 2022-10-10

## 2022-10-10 PROBLEM — I50.22 CHRONIC SYSTOLIC HEART FAILURE: Status: ACTIVE | Noted: 2022-10-10

## 2022-10-10 PROBLEM — I10 HYPERTENSION: Status: ACTIVE | Noted: 2022-10-10

## 2022-10-10 PROBLEM — N17.9 AKI (ACUTE KIDNEY INJURY): Status: RESOLVED | Noted: 2022-10-07 | Resolved: 2022-10-10

## 2022-10-10 PROBLEM — D63.8 ANEMIA OF CHRONIC DISEASE: Status: ACTIVE | Noted: 2022-10-10

## 2022-10-10 LAB
ALBUMIN SERPL BCP-MCNC: 3.3 G/DL (ref 3.5–5.2)
ALP SERPL-CCNC: 73 U/L (ref 55–135)
ALT SERPL W/O P-5'-P-CCNC: 38 U/L (ref 10–44)
ANION GAP SERPL CALC-SCNC: 12 MMOL/L (ref 8–16)
AST SERPL-CCNC: 28 U/L (ref 10–40)
BASOPHILS # BLD AUTO: 0.02 K/UL (ref 0–0.2)
BASOPHILS NFR BLD: 0.2 % (ref 0–1.9)
BILIRUB SERPL-MCNC: 0.9 MG/DL (ref 0.1–1)
BUN SERPL-MCNC: 36 MG/DL (ref 6–20)
CALCIUM SERPL-MCNC: 9.9 MG/DL (ref 8.7–10.5)
CHLORIDE SERPL-SCNC: 98 MMOL/L (ref 95–110)
CO2 SERPL-SCNC: 27 MMOL/L (ref 23–29)
CREAT SERPL-MCNC: 2.8 MG/DL (ref 0.5–1.4)
DIFFERENTIAL METHOD: ABNORMAL
EOSINOPHIL # BLD AUTO: 0.1 K/UL (ref 0–0.5)
EOSINOPHIL NFR BLD: 1 % (ref 0–8)
ERYTHROCYTE [DISTWIDTH] IN BLOOD BY AUTOMATED COUNT: 13.4 % (ref 11.5–14.5)
EST. GFR  (NO RACE VARIABLE): 28 ML/MIN/1.73 M^2
GLUCOSE SERPL-MCNC: 109 MG/DL (ref 70–110)
HCT VFR BLD AUTO: 39.5 % (ref 40–54)
HGB BLD-MCNC: 12.6 G/DL (ref 14–18)
IMM GRANULOCYTES # BLD AUTO: 0.04 K/UL (ref 0–0.04)
IMM GRANULOCYTES NFR BLD AUTO: 0.3 % (ref 0–0.5)
LYMPHOCYTES # BLD AUTO: 1.9 K/UL (ref 1–4.8)
LYMPHOCYTES NFR BLD: 16.1 % (ref 18–48)
MCH RBC QN AUTO: 29 PG (ref 27–31)
MCHC RBC AUTO-ENTMCNC: 31.9 G/DL (ref 32–36)
MCV RBC AUTO: 91 FL (ref 82–98)
MONOCYTES # BLD AUTO: 0.8 K/UL (ref 0.3–1)
MONOCYTES NFR BLD: 6.4 % (ref 4–15)
NEUTROPHILS # BLD AUTO: 9 K/UL (ref 1.8–7.7)
NEUTROPHILS NFR BLD: 76 % (ref 38–73)
NRBC BLD-RTO: 0 /100 WBC
PLATELET # BLD AUTO: 554 K/UL (ref 150–450)
PMV BLD AUTO: 8.7 FL (ref 9.2–12.9)
POTASSIUM SERPL-SCNC: 3.7 MMOL/L (ref 3.5–5.1)
PROT SERPL-MCNC: 7.2 G/DL (ref 6–8.4)
RBC # BLD AUTO: 4.34 M/UL (ref 4.6–6.2)
SODIUM SERPL-SCNC: 137 MMOL/L (ref 136–145)
WBC # BLD AUTO: 11.89 K/UL (ref 3.9–12.7)

## 2022-10-10 PROCEDURE — 85025 COMPLETE CBC W/AUTO DIFF WBC: CPT

## 2022-10-10 PROCEDURE — 94761 N-INVAS EAR/PLS OXIMETRY MLT: CPT

## 2022-10-10 PROCEDURE — 36415 COLL VENOUS BLD VENIPUNCTURE: CPT

## 2022-10-10 PROCEDURE — 99232 PR SUBSEQUENT HOSPITAL CARE,LEVL II: ICD-10-PCS | Mod: ,,, | Performed by: INTERNAL MEDICINE

## 2022-10-10 PROCEDURE — 25000003 PHARM REV CODE 250

## 2022-10-10 PROCEDURE — 80053 COMPREHEN METABOLIC PANEL: CPT

## 2022-10-10 PROCEDURE — 99900035 HC TECH TIME PER 15 MIN (STAT)

## 2022-10-10 PROCEDURE — 99232 SBSQ HOSP IP/OBS MODERATE 35: CPT | Mod: ,,, | Performed by: INTERNAL MEDICINE

## 2022-10-10 RX ORDER — AMLODIPINE BESYLATE 10 MG/1
10 TABLET ORAL DAILY
Qty: 90 TABLET | Refills: 3 | Status: SHIPPED | OUTPATIENT
Start: 2022-10-10 | End: 2023-10-10

## 2022-10-10 RX ORDER — HYDRALAZINE HYDROCHLORIDE 100 MG/1
100 TABLET, FILM COATED ORAL EVERY 8 HOURS
Qty: 270 TABLET | Refills: 3 | Status: SHIPPED | OUTPATIENT
Start: 2022-10-10 | End: 2023-10-10

## 2022-10-10 RX ORDER — VALSARTAN 40 MG/1
80 TABLET ORAL DAILY
Qty: 180 TABLET | Refills: 3 | Status: SHIPPED | OUTPATIENT
Start: 2022-10-10 | End: 2023-10-10

## 2022-10-10 RX ORDER — FUROSEMIDE 40 MG/1
40 TABLET ORAL DAILY
Qty: 90 TABLET | Refills: 3 | Status: SHIPPED | OUTPATIENT
Start: 2022-10-10 | End: 2023-10-10

## 2022-10-10 RX ORDER — ISOSORBIDE DINITRATE 20 MG/1
20 TABLET ORAL 3 TIMES DAILY
Qty: 270 TABLET | Refills: 3 | Status: SHIPPED | OUTPATIENT
Start: 2022-10-10 | End: 2023-10-10

## 2022-10-10 RX ORDER — CARVEDILOL 6.25 MG/1
6.25 TABLET ORAL 2 TIMES DAILY
Qty: 180 TABLET | Refills: 3 | Status: SHIPPED | OUTPATIENT
Start: 2022-10-10 | End: 2023-10-10

## 2022-10-10 RX ORDER — DAPAGLIFLOZIN 5 MG/1
5 TABLET, FILM COATED ORAL DAILY
Qty: 90 TABLET | Refills: 3 | Status: SHIPPED | OUTPATIENT
Start: 2022-10-10 | End: 2022-10-10 | Stop reason: HOSPADM

## 2022-10-10 RX ORDER — ATORVASTATIN CALCIUM 40 MG/1
40 TABLET, FILM COATED ORAL DAILY
Qty: 90 TABLET | Refills: 3 | Status: SHIPPED | OUTPATIENT
Start: 2022-10-10 | End: 2023-10-10

## 2022-10-10 RX ADMIN — CARVEDILOL 6.25 MG: 6.25 TABLET, FILM COATED ORAL at 08:10

## 2022-10-10 RX ADMIN — MUPIROCIN: 20 OINTMENT TOPICAL at 08:10

## 2022-10-10 RX ADMIN — AMLODIPINE BESYLATE 10 MG: 5 TABLET ORAL at 08:10

## 2022-10-10 RX ADMIN — HYDRALAZINE HYDROCHLORIDE 100 MG: 25 TABLET, FILM COATED ORAL at 05:10

## 2022-10-10 RX ADMIN — HYDRALAZINE HYDROCHLORIDE 100 MG: 25 TABLET, FILM COATED ORAL at 02:10

## 2022-10-10 RX ADMIN — VALSARTAN 40 MG: 40 TABLET, FILM COATED ORAL at 08:10

## 2022-10-10 NOTE — PROGRESS NOTES
[facility] Medicine Progress Note    Primary Team: [facility] Hospitalist Team A  Attending Physician: Nuzhat Patel MD  Resident: Nicolas/Brandt  Intern: Moni/Jg    Subjective:      Patient reports he overall felt well this morning with no acute complaints.  He reports no further episodes of chest pain or shortness of breath.  Blood pressure overnight was improved with systolic blood pressures between 110-120.  Afebrile.    Objective:     Last 24 Hour Vital Signs:  BP  Min: 110/75  Max: 192/123  Temp  Av.9 °F (36.6 °C)  Min: 96.6 °F (35.9 °C)  Max: 98.8 °F (37.1 °C)  Pulse  Av.6  Min: 92  Max: 108  Resp  Av.8  Min: 11  Max: 41  SpO2  Av.1 %  Min: 92 %  Max: 98 %  I/O last 3 completed shifts:  In: 1151.2 [P.O.:1110; I.V.:41.2]  Out: 3775 [Urine:3775]    Physical Examination:  Gen: WDWN male, NAD, resting comfortably in bed  HEENT: NC/AT, EOMI grossly, normal external ears bilaterally, normal external nose, moist MM  Neck: Supple  CV: Regular rate, regular rhythm, 2+ radial pulses bilaterally  Resp: CTAB. No wheezes, rales, rhonchi. Normal work of breathing. Normal effort. Equal chest rise. No respiratory distress  Abd: Soft, nontender, nondistended, no rebound/guarding  MSK/Ext: No clubbing, cyanosis, or edema. Moves all four extremities spontaneously  Neuro: GCS 15, alert, speech is normal, face symmetric, no focal motor deficits elicited on exam  Skin: Warm, dry, no rash  Psych: Normal mood and affect     Laboratory:  Recent Labs   Lab 10/08/22  0456 10/09/22  0407 10/10/22  0231   WBC 13.67* 13.93* 11.89   HGB 10.2* 11.4* 12.6*   HCT 32.1* 35.9* 39.5*   * 487* 554*   MCV 93 90 91   RDW 13.3 13.3 13.4    139 137   K 3.7 3.3* 3.7    99 98   CO2 21* 24 27   BUN 25* 31* 36*   CREATININE 2.3* 2.5* 2.8*   * 128* 109   PROT 6.3 7.2 7.2   ALBUMIN 3.1* 3.4* 3.3*   BILITOT 0.7 0.9 0.9   AST 20 23 28   ALKPHOS 64 77 73   ALT 23 30 38     Microbiology Data Reviewed: No  new micro    Other Results:  EKG: No new EKG  Echo:  The left ventricle is moderately enlarged with moderate concentric hypertrophy and severely decreased systolic function.  The estimated ejection fraction is 30%.  There is severe left ventricular global hypokinesis.  Mild mitral regurgitation.  Mild tricuspid regurgitation.  Mild pulmonic regurgitation.  Normal right ventricular size with normal right ventricular systolic function.  There is no pulmonary hypertension.  Small anterior pericardial effusion.    Radiology: No new imaging    Current Medications:     Infusions:       Scheduled:   amLODIPine  10 mg Oral Daily    carvediloL  6.25 mg Oral BID    enoxaparin  40 mg Subcutaneous Daily    hydrALAZINE  100 mg Oral Q8H    mupirocin   Nasal BID    valsartan  40 mg Oral BID        PRN:  influenza, pneumoc 20-haile conj-dip cr(PF), sars-cov-2 (covid-19), sodium chloride 0.9%    Antibiotics and Day Number of Therapy:  None    Lines and Day Number of Therapy:  PIV 10/7/22    Assessment:      Abe Pak is a 41 y.o. male with no past medical history who presents for shortness of breath, and chest tightness for 2 weeks. Patient's Bp was in the 200s/130s in the ED and labetalol, lasix, and metoprolol was administered.  Patient was initially admitted to the ICU for nitroglycerin drip and was transitioned to oral antihypertensives with adequate blood pressure control.  On further evaluation found to have EF 30% and initiated on guideline directed medical therapy.     Plan:      Hypertensive emergency  Chest pain with elevated troponin  - SOB, CP worsening for 2 weeks  - In the ED patient's Bp was in the 200s/130s  Given labetalol, lasix, and metoprolol with improvement in symptoms  - Troponin 0.079>>0.095>>0.083    -   -   - EKG Sinus tachycardia, R axis deviation, LVH with secondary ST T wave changes  - CXR concerning for PNA vs aspiration R>L, no pleural effusion, enlarged cardiac sillouette  - Echo  revealed global LV hypokinesis with EF 30%  - Hydralazine 100 mg q8h, coreg 6.25 BID, Norvasc 10 daily, valsartan 40 bid  - Atorvastatin 40 and will initiate SGLT2 on discharge    Heart failure with reduced EF, new diagnosis  - Echo revealed EF 30% and global LV hypokinesis with severely decreased systolic function  - BP control as noted above  - Consult cardiology  - ASA 81 daily  - initiated on valsartan, Coreg, lipitor, and will initiate SGLT2 on discharge     Anemia of Chronic Disease  - H/H 11.4/36.2 MCV 93  - TIBC WNL, saturation 7% with a low iron  - Ferritin normal 185     GILMER  - BUN 23 Cr 2.2 baseline ~1  - FeNa 0.4% FeUrea >35% prerenal etiology  - Patient appears hypervolemic on exam JVP 8-9, CXR with patchy opacities, GILMER possibly from HF etiology  - Cr 2.8, may be new baseline. Continue to monitor in the outpatient     Healthcare Maintenance  - Flu, Prevnar, Covid shots prior to discharge  - HbA1c 4.8  - Lipid panel  - Establish with PCP    Diet: Cardiac  DVT Prophylaxis: lovenox  IVF: None  Code: Full    Dispo: likely discharge today with PCP and Cardiology follow up    Ronnell Carlton MD  [facility] HO-3  [facility] Hospitalist Team A    [facility] Medicine Hospitalist Pager numbers:   [facility] Hospitalist Medicine Team A (Jorge/Leon): [phone]  [facility] Hospitalist Medicine Team B (Aimee/Noel):  [phone]

## 2022-10-10 NOTE — PROGRESS NOTES
[facility]                    Pharmacy       Discharge Medication Education    Patient ACCEPTED medication education. Pharmacy has provided education on the name, indication, and possible side effects of the medication(s) prescribed, using teach-back method.     The following medications have also been discussed, during this admission.        Medication List        START taking these medications      amLODIPine 10 MG tablet  Commonly known as: NORVASC  Take 1 tablet (10 mg total) by mouth once daily.     atorvastatin 40 MG tablet  Commonly known as: LIPITOR  Take 1 tablet (40 mg total) by mouth once daily.     carvediloL 6.25 MG tablet  Commonly known as: COREG  Take 1 tablet (6.25 mg total) by mouth 2 (two) times daily.     dapagliflozin 5 mg Tab tablet  Commonly known as: FARXIGA  Take 1 tablet (5 mg total) by mouth once daily.     hydrALAZINE 100 MG tablet  Commonly known as: APRESOLINE  Take 1 tablet (100 mg total) by mouth every 8 (eight) hours.     isosorbide dinitrate 20 MG tablet  Commonly known as: ISORDIL  Take 1 tablet (20 mg total) by mouth 3 (three) times daily.     valsartan 40 MG tablet  Commonly known as: DIOVAN  Take 2 tablets (80 mg total) by mouth once daily.               Where to Get Your Medications        These medications were sent to [facility]  [address], [place] [address]      Hours: Mon-Fri, 8a-5:30p Phone: [phone]   amLODIPine 10 MG tablet  atorvastatin 40 MG tablet  carvediloL 6.25 MG tablet  dapagliflozin 5 mg Tab tablet  hydrALAZINE 100 MG tablet  isosorbide dinitrate 20 MG tablet  valsartan 40 MG tablet          Thank you  Zeke Stephenson, PharmD  [phone]

## 2022-10-10 NOTE — PROGRESS NOTES
"[facility] Cardiology Progress Note    Date of Admit: 10/7/2022  Subjective     Patient was in his usual state of health until 2 weeks ago when he started to experience dyspnea on exertional and chest pressure. 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 Prior to two weeks ago, patient denies any symptoms such as chest pain or SOB with physical exertion. However, about 1.5 weeks ago he noted SOB while playing softball but didn't think much of, he then developed continued chest pressure in the next several days for which he came to the emergency room. Patient is symptom free today; chest pressure has resolved and he denies SOB. He has been walking around the unit without issues.      Objective:   Last 24 Hour Vital Signs:  BP  Min: 110/75  Max: 192/123  Temp  Av.8 °F (36.6 °C)  Min: 96.6 °F (35.9 °C)  Max: 98.8 °F (37.1 °C)  Pulse  Av.6  Min: 92  Max: 108  Resp  Av.9  Min: 11  Max: 41  SpO2  Av.1 %  Min: 92 %  Max: 98 %      Intake/Output Summary (Last 24 hours) at 10/10/2022 0844  Last data filed at 10/10/2022 0725  Gross per 24 hour   Intake 1056.98 ml   Output 2000 ml   Net -943.02 ml       Physical Examination:  /77 (BP Location: Left arm, Patient Position: Lying)   Pulse 97   Temp 97.4 °F (36.3 °C)   Resp 18   Ht 5' 8" (1.727 m)   Wt 108.5 kg (239 lb 3.2 oz)   SpO2 96%   BMI 36.37 kg/m²    General: Well developed and well nourished. Alert and oriented x 3. No acute distress.    Head: Head held erect and midline. Skull is normocephalic and symmetric. Symmetric facial features.   Eyes: Eyelids & skin overlying globe are intact. Sclera is white and not injected.   Neck: Full ROM. No obvious enlargement or masses.    Cardiovascular: RRR. No murmurs, rubs or gallops. No JVD. No pitting edema in the lower extremities.   Abdomen: Bowel sounds in all 4 quadrants. Abdomen is soft and non-distended. No tenderness to palpation. No " rebound or guarding.   Extremities: All 4 extremities are warm with palpable pulses. Moving all 4 extremities voluntarily.     Laboratory:  CBC:   Recent Labs   Lab 10/08/22  0456 10/09/22  0407 10/10/22  0231   WBC 13.67* 13.93* 11.89   HGB 10.2* 11.4* 12.6*   * 487* 554*   MCV 93 90 91   RDW 13.3 13.3 13.4     CMP:   Recent Labs   Lab 10/08/22  0456 10/09/22  0407 10/10/22  0231    139 137   K 3.7 3.3* 3.7    99 98   CO2 21* 24 27   * 128* 109   BUN 25* 31* 36*   CREATININE 2.3* 2.5* 2.8*   CALCIUM 9.0 9.9 9.9   MG 2.1  --   --      LFTs:   Recent Labs   Lab 10/08/22  0456 10/09/22  0407 10/10/22  0231   PROT 6.3 7.2 7.2   ALBUMIN 3.1* 3.4* 3.3*   ALKPHOS 64 77 73   AST 20 23 28   ALT 23 30 38     Cardiac Data:    Recent Labs   Lab 10/07/22  1139 10/07/22  1545 10/07/22  2121   TROPONINI 0.079* 0.095* 0.083*   *  --   --        TTE 10/07/2022  The left ventricle is moderately enlarged with moderate concentric hypertrophy and severely decreased systolic function.  The estimated ejection fraction is 30%.  There is severe left ventricular global hypokinesis.  Mild mitral regurgitation.  Mild tricuspid regurgitation.  Mild pulmonic regurgitation.  Normal right ventricular size with normal right ventricular systolic function.  There is no pulmonary hypertension.  Small anterior pericardial effusion.      Current Medications:       Scheduled:   amLODIPine  10 mg Oral Daily    carvediloL  6.25 mg Oral BID    enoxaparin  40 mg Subcutaneous Daily    hydrALAZINE  100 mg Oral Q8H    mupirocin   Nasal BID    valsartan  40 mg Oral BID        PRN:  influenza, pneumoc 20-haile conj-dip cr(PF), sars-cov-2 (covid-19), sodium chloride 0.9%       Assessment/ Plan:     Cardiomyopathy HFrEF 30% NYHA II-III Stage C   Etiology: Most likely 2/2 to uncontrolled hypertension; ischemic etiology needs to be ruled out.   - Risk factors op: LDL 92.6, A1c 4.8%, non-smoker and family history significant only got  hypertension in parents.     Recommendations  - Continue current antihypertensive regimen with amlodipine, hydralazine and valsartan. Would recommend up titration of Coreg with goal heart rate < 70. Would also recommend adding isosorbide dinitrate 20mg TID and lasix po 40mg QD to daily medication regimen. Will further optimize GDMT on outpatient basis (addition of SGLT2i and MRA antagonist).  - Reassess EF after 3 months of GDMT.   - Follow up with cardiology within 10 days of discharge for outpatient ischemic workup of HFrEF.     Dianelys Hill MD  [facility] Internal Medicine HO-II    Plan discussed with cardiology fellow Terry Yoo MD.

## 2022-10-10 NOTE — NURSING
Discharge orders noted. IV and tele box removed. AVS printed and given to patient. VN reviewed AVS with patient. Patient left with family. No distress noted.

## 2022-10-10 NOTE — PLAN OF CARE
Problem: Adult Inpatient Plan of Care  Goal: Plan of Care Review  Outcome: Ongoing, Progressing  Goal: Absence of Hospital-Acquired Illness or Injury  Outcome: Ongoing, Progressing  Goal: Optimal Comfort and Wellbeing  Outcome: Ongoing, Progressing     Problem: Hypertension Acute  Goal: Blood Pressure Within Desired Range  Outcome: Ongoing, Progressing     Problem: Fall Injury Risk  Goal: Absence of Fall and Fall-Related Injury  Outcome: Ongoing, Progressing     Problem: Adjustment to Illness (Heart Failure)  Goal: Optimal Coping  Outcome: Ongoing, Progressing

## 2022-10-10 NOTE — PLAN OF CARE
"   10/10/22 1332   Post-Acute Status   Post-Acute Authorization Other   Other Status Awaiting f/u Appts     Future Appointments   Date Time Provider Department Center   10/26/2022  1:30 PM Kellen Gonzales MD [facility]     /82 (BP Location: Left arm, Patient Position: Lying)   Pulse 84   Temp 97.3 °F (36.3 °C) (Oral)   Resp 20   Ht 5' 8" (1.727 m)   Wt 108.5 kg (239 lb 3.2 oz)   SpO2 97%   BMI 36.37 kg/m²      Follow-up Information       Arvind Paul MD Follow up.    Specialties: Cardiology, Interventional Cardiology  Contact information:  [address]  [address]  [facility] [id number] [phone]               [facility]. Go on 10/26/2022.    Specialty: [facility]  Why: FOLLOW UP WITH PCP AFTER [facility] APPT  Contact information:  [address]  [facility] [address] [phone]  Additional information:  Please park in Lot C or D and use [address] entrance. Take Medical Office Bldg elevators.             [facility] - Cardiology. Go to.    Specialties: Cardiology, Cardiovascular Disease, Cardiac Rehabilitation, Cardiothoracic Surgery  Why: As needed, FOLLOW UP WITH CARDIOLOGIST AFTER DISCHARGE, CLINICALS FAXED TO OFFICE, If date/time not listed,  will contact  Contact information:  [address] [facility] [address]  [phone]                            amLODIPine  10 mg Oral Daily    carvediloL  6.25 mg Oral BID    enoxaparin  40 mg Subcutaneous Daily    hydrALAZINE  100 mg Oral Q8H    mupirocin   Nasal BID    valsartan  40 mg Oral BID         "

## 2022-10-10 NOTE — PLAN OF CARE
[facility] - Telemetry  Initial Discharge Assessment       Primary Care Provider: Primary Doctor No    Admission Diagnosis: Shortness of breath [R06.02]  Elevated troponin [R77.8]  Chest pain [R07.9]  Congestive heart failure, unspecified HF chronicity, unspecified heart failure type [I50.9]    Admission Date: 10/7/2022  Expected Discharge Date: 10/10/2022    Pt oriented. DCA done at bedside with patient. Demographics/Ins/NextofKin/PCP verified with patient/family and updated as needed. Denies any DME needs at present from pt/family. Patient/family plans to return home with family. Educated on bedside RX. Patient consents for [place] to discuss discharge plan with next of kin. Preferences appointment times and location obtained. Patient reports they will have transportation home upon discharge.           Payor: /     Extended Emergency Contact Information  Primary Emergency Contact: PashaAve  Mobile Phone: [phone]  Relation: Mother  Secondary Emergency Contact: Tatianna Campos   [facility]  Home Phone: [phone]  Relation: Relative    Discharge Plan A: Home, Home with family       No Pharmacies Listed       10/10/22 2919   Discharge Planning   Assessment Type Discharge Planning Brief Assessment   Resource/Environmental Concerns none   Support Systems Spouse/significant other   Equipment Currently Used at Home none   Current Living Arrangements home/apartment/condo   Patient/Family Anticipates Transition to home;home with family   Patient/Family Anticipated Services at Transition none   DME Needed Upon Discharge  none   Discharge Plan A Home;Home with family   Financial Resource Strain   How hard is it for you to pay for the very basics like food, housing, medical care, and heating? Not hard   Housing Stability   In the last 12 months, was there a time when you were not able to pay the mortgage or rent on time? N   In the last 12 months, was there a time when you did not have a steady place to sleep or  "slept in a shelter (including now)? N   Transportation Needs   In the past 12 months, has lack of transportation kept you from medical appointments or from getting medications? no   In the past 12 months, has lack of transportation kept you from meetings, work, or from getting things needed for daily living? No   Food Insecurity   Within the past 12 months, you worried that your food would run out before you got the money to buy more. Never true   Within the past 12 months, the food you bought just didn't last and you didn't have money to get more. Never true     Future Appointments   Date Time Provider Department Center   10/24/2022 11:00 AM Arvind Paul MD [facility]   10/26/2022  1:30 PM Kellen Gonzales MD [facility]     /82 (BP Location: Left arm, Patient Position: Lying)   Pulse 84   Temp 97.3 °F (36.3 °C) (Oral)   Resp 20   Ht 5' 8" (1.727 m)   Wt 108.5 kg (239 lb 3.2 oz)   SpO2 97%   BMI 36.37 kg/m²        Medication List        START taking these medications      amLODIPine 10 MG tablet  Commonly known as: NORVASC  Take 1 tablet (10 mg total) by mouth once daily.     atorvastatin 40 MG tablet  Commonly known as: LIPITOR  Take 1 tablet (40 mg total) by mouth once daily.     carvediloL 6.25 MG tablet  Commonly known as: COREG  Take 1 tablet (6.25 mg total) by mouth 2 (two) times daily.     dapagliflozin 5 mg Tab tablet  Commonly known as: FARXIGA  Take 1 tablet (5 mg total) by mouth once daily.     hydrALAZINE 100 MG tablet  Commonly known as: APRESOLINE  Take 1 tablet (100 mg total) by mouth every 8 (eight) hours.     isosorbide dinitrate 20 MG tablet  Commonly known as: ISORDIL  Take 1 tablet (20 mg total) by mouth 3 (three) times daily.     valsartan 40 MG tablet  Commonly known as: DIOVAN  Take 2 tablets (80 mg total) by mouth once daily.               Where to Get Your Medications        These medications were sent to [facility]  [address], [place] [address]      Hours: Mon-Fri, 8a-5:30p Phone: [phone]   amLODIPine 10 MG tablet  atorvastatin 40 MG tablet  carvediloL 6.25 MG tablet  dapagliflozin 5 mg Tab tablet  hydrALAZINE 100 MG tablet  isosorbide dinitrate 20 MG tablet  valsartan 40 MG tablet

## 2022-10-10 NOTE — PLAN OF CARE
VN Note: Labs, notes, orders, care plan review will continue to monitor and be available as needed.   Problem: Adult Inpatient Plan of Care  Goal: Plan of Care Review  Outcome: Ongoing, Progressing

## 2022-10-10 NOTE — PLAN OF CARE
[place] - Telemetry  Discharge Final Note    Primary Care Provider: Primary Doctor No    Expected Discharge Date: 10/10/2022    Pharmacist will go over home medications and reasons for medications. VN and bedside nurse to reiterate final discharge instructions.     Cleared from CM . Bedside Nurse and VN notified.      Final Discharge Note (most recent)       Final Note - 10/10/22 1734          Final Note    Assessment Type Final Discharge Note (P)      Anticipated Discharge Disposition Home or Self Care (P)      Hospital Resources/Appts/Education Provided Appointments scheduled and added to AVS (P)         Post-Acute Status    Post-Acute Authorization Other (P)      Other Status No Post-Acute Service Needs (P)      Discharge Delays None known at this time (P)                    Future Appointments   Date Time Provider Department Center   10/24/2022 11:00 AM Arvind Paul MD [facility]  [facility]   10/26/2022  1:30 PM Kellen Gonzales MD [facility] [facility]         Contact Info       Arvind Paul MD   Specialty: Cardiology, Interventional Cardiology    [address]  [address]  [place] [address]   Phone: [phone]       Next Steps: Follow up    [place] [facility]   Specialty: [facility]    [address]  [place] [address]   Phone: [phone]       Next Steps: Go on 10/26/2022    Instructions: FOLLOW UP WITH PCP AFTER [facility] - Cardiology   Specialty: Cardiology, Cardiovascular Disease, Cardiac Rehabilitation, Cardiothoracic Surgery    [address] [facility] [address]   Phone: [phone]       Next Steps: Go to    Instructions: As needed, FOLLOW UP WITH CARDIOLOGIST AFTER DISCHARGE, CLINICALS FAXED TO OFFICE, If date/time not listed,  will contact             Medication List        START taking these medications      amLODIPine 10 MG tablet  Commonly known as: NORVASC  Take 1 tablet (10 mg total)  by mouth once daily.     atorvastatin 40 MG tablet  Commonly known as: LIPITOR  Take 1 tablet (40 mg total) by mouth once daily.     carvediloL 6.25 MG tablet  Commonly known as: COREG  Take 1 tablet (6.25 mg total) by mouth 2 (two) times daily.     dapagliflozin 5 mg Tab tablet  Commonly known as: FARXIGA  Take 1 tablet (5 mg total) by mouth once daily.     hydrALAZINE 100 MG tablet  Commonly known as: APRESOLINE  Take 1 tablet (100 mg total) by mouth every 8 (eight) hours.     isosorbide dinitrate 20 MG tablet  Commonly known as: ISORDIL  Take 1 tablet (20 mg total) by mouth 3 (three) times daily.     valsartan 40 MG tablet  Commonly known as: DIOVAN  Take 2 tablets (80 mg total) by mouth once daily.               Where to Get Your Medications        These medications were sent to [facility]  [address] [address]      Hours: Mon-Fri, 8a-5:30p Phone: [phone]   amLODIPine 10 MG tablet  atorvastatin 40 MG tablet  carvediloL 6.25 MG tablet  dapagliflozin 5 mg Tab tablet  hydrALAZINE 100 MG tablet  isosorbide dinitrate 20 MG tablet  valsartan 40 MG tablet

## 2022-10-10 NOTE — DISCHARGE SUMMARY
[facility] Hospital Medicine Discharge Summary    Primary Team: [facility] Hospitalist Team A  Attending Physician: Nuzhat Patel MD  Resident: Brandt  Intern: Jg    Date of Admit: 10/7/2022  Date of Discharge: 10/10/2022    Discharge to: Home  Condition: Stable    Discharge Diagnoses     Patient Active Problem List   Diagnosis    Normocytic anemia    Hypertensive emergency    Anemia of chronic disease    Stage 3b chronic kidney disease (CKD)    Chronic systolic heart failure    Hypertension       Consultants and Procedures     Consultants:  Cardiology  Intensive Care    Procedures:   None    Brief History of Present Illness      HPI: Abe Pak is a 41 y.o. man with no past medical history who presents for shortness of breath, and chest tightness for 2 weeks. Pt was in his usual state of health when this SOB and chest tightness began spontaneously. It continued to worsen until the day of presentation when he was going to work as a  and the severity of his symptoms lead to him to get checked out in the ER. He states it feels like someone is standing on his chest and it is improved when he is at rest but exacerbated with ambulation. He also states he has been experiencing some diaphoresis and coughing/spitting up yellow sputum.     Hospital Course: PT was admitted to [facility] for hypertensive emergency and acute kidney injury.  Patient was given orally antihypertensive is in the emergency department and initiated on a nitroglycerin drip and admitted to the ICU.  Aggressive oral antihypertension regimen was initiated with control of blood pressure of the subsequent 48 hours.  Patient's chest pain resolved with IV diuresis and blood pressure control.  On TTE patient found to have EF of 30% and Cardiology consulted for outpatient follow-up and ischemic workup.  Patient discharged home with oral antihypertensives and SGLT 2 inhibitor with planned follow-up with PCP and Cardiology for continued up  titration of guideline directed medical therapy.    For the full HPI please refer to the History & Physical from this admission.    Hospital Course By Problem with Pertinent Findings     Hypertensive emergency, resolved  - SOB, CP worsening for 2 weeks  - In the ED patient's Bp was in the 200s/130s  Given labetalol, lasix, and metoprolol with improvement in symptoms  - Troponin 0.079>>0.095>>0.083,   - EKG Sinus tachycardia, R axis deviation, LVH with secondary ST T wave changes  - CXR concerning for PNA vs aspiration R>L, no pleural effusion, enlarged cardiac sillouette  - Echo revealed global LV hypokinesis with EF 30%  - discharged on hydralazine 100 mg, carvedilol 6.25 b.i.d., isosorbide dinitrate 20 t.i.d., Norvasc 10, valsartan 80 daily, Lipitor 40 daily, and lasix 40 daily  - Deferred initiation of SGLT2 inhibitor due to cost  - PT to establish with PCP and Cardiology on discharge    Hypertension  - patient with severe hypertension and young age without significant family history   - initiated on Norvasc, valsartan, isosorbide dinitrate, hydralazine, and carvedilol during inpatient stay in order to achieve blood pressure control  - initiated secondary hypertension workup:  Cortisol within normal limits, TSH within normal limits, renal artery ultrasound unremarkable, renin aldosterone ratio in process, plasma metanephrines in process.  - if everything returns negative could consider evaluation for obstructive sleep apnea  - patient to continue to follow-up with cardiology and to establish with primary care for further evaluation.     Heart failure with reduced EF, new diagnosis  - Echo revealed EF 30% and global LV hypokinesis with severely decreased systolic function  - BP control as noted above  - cardiology consulted and recommended continued follow-up in the outpatient as well as outpatient ischemic evaluation.  - initiated on hydralazine 100 t.i.d., isosorbide dinitrate 20 t.i.d., carvedilol 6.25  b.i.d., valsartan 80 daily, atorvastatin 40 daily, and lasix 40 daily  - Deferred initiation of SGLT2 inhibitor due to cost     Anemia of Chronic Disease  - H/H 11.4/36.2 MCV 93  - TIBC WNL, saturation 7% with a low iron  - Ferritin normal 185  - patient will be continued monitoring and follow-up after stable on guideline directed medical therapy for further evaluation of new onset anemia     Chronic Kidney Disease Stage 3b  - BUN 23 Cr 2.2 baseline ~1  - FeNa 0.4% FeUrea >35% prerenal etiology  - Initially hypervolemic on exam, received diuresis  - Increase in creatinine likely 2/2 IV diuresis and initiation of Valsartan  - Cr 2.8, may be new baseline. Continue to monitor in the outpatient  - patient would benefit from SGLT2i but this was deferred due to cost     Healthcare Maintenance  - Flu, Prevnar, Covid shots on discharge  - HbA1c 4.8  - Lipid panel 10/9:  Cholesterol 149, HDL 41, LDL 92, triglyceride 77.  On Lipitor 40 for new onset heart failure  - Establish with PCP    Discharge Medications        Medication List        START taking these medications      amLODIPine 10 MG tablet  Commonly known as: NORVASC  Take 1 tablet (10 mg total) by mouth once daily.     atorvastatin 40 MG tablet  Commonly known as: LIPITOR  Take 1 tablet (40 mg total) by mouth once daily.     carvediloL 6.25 MG tablet  Commonly known as: COREG  Take 1 tablet (6.25 mg total) by mouth 2 (two) times daily.     furosemide 40 MG tablet  Commonly known as: LASIX  Take 1 tablet (40 mg total) by mouth once daily.     hydrALAZINE 100 MG tablet  Commonly known as: APRESOLINE  Take 1 tablet (100 mg total) by mouth every 8 (eight) hours.     isosorbide dinitrate 20 MG tablet  Commonly known as: ISORDIL  Take 1 tablet (20 mg total) by mouth 3 (three) times daily.     valsartan 40 MG tablet  Commonly known as: DIOVAN  Take 2 tablets (80 mg total) by mouth once daily.               Where to Get Your Medications        These medications were sent to  [facility]  [address] [place] [address] [address]      Hours: Mon-Fri, 8a-5:30p Phone: [phone]   amLODIPine 10 MG tablet  atorvastatin 40 MG tablet  carvediloL 6.25 MG tablet  furosemide 40 MG tablet  hydrALAZINE 100 MG tablet  isosorbide dinitrate 20 MG tablet  valsartan 40 MG tablet         Discharge Information:   Diet:  Cardiac Diet    Physical Activity:  As tolerated by patient.              Instructions:  1. Take all medications as prescribed  2. Keep all follow-up appointments  3. Return to the hospital or call your primary care physicians if any worsening symptoms such as fever, chest pain, shortness of breath, return of symptoms, or any other concerns.    Follow-Up Appointments:  Cardiology  PCP    Ronnell Carlton MD  [facility] [address] Hospitalist Team A

## 2022-10-10 NOTE — TREATMENT PLAN
[facility] RN met with patient regarding [facility] hospital follow up upon discharge. Pt agreeable to hospital follow up .RN informed pt of scheduled appointment and that appointment will also appear on d/c AVS. Patient informed to bring portable home O2 if used and all medication bottles to [facility] follow up appointment.  [facility] information handout, appointment letter and folder provided to patient. Pt anticipated to drive self to appointment    Patient Contact info: [phone]    Person providing transportation contact info: pt drives    Barriers to attending PCC visit: none     Future Appointments   Date Time Provider Department Center   10/24/2022 11:00 AM Arvind Paul MD [facility]  [facility]   10/26/2022  1:30 PM [facility]

## 2022-10-12 ENCOUNTER — PATIENT OUTREACH (OUTPATIENT)
Dept: ADMINISTRATIVE | Facility: CLINIC | Age: 42
End: 2022-10-12

## 2022-10-12 NOTE — PROGRESS NOTES
C3 nurse attempted to contact Abe Pak for a TCC post hospital discharge follow up call. No answer. Left voicemail with callback information. The patient has a scheduled HOSFU appointment with Kellen Gonzales on 10/26/22 @ 5036.

## 2022-10-13 NOTE — PROGRESS NOTES
C3 Nurse made second attempt to reach patient for TCC call. Left voicemail asking patient to please call [phone] and leave first name, last name, and , and Maritza will return call.

## 2022-10-14 LAB — RENIN PLAS-CCNC: 10 NG/ML/H

## 2022-10-15 LAB
ALDOST SERPL-MCNC: 8.3 NG/DL
ALDOST/RENIN PLAS-RTO: 2 RATIO
RENIN PLAS-CCNC: 4.1 NG/ML/HR

## 2022-10-16 LAB
METANEPH FREE SERPL-MCNC: 77 PG/ML
METANEPHS SERPL-MCNC: 264 PG/ML
NORMETANEPH FREE SERPL-MCNC: 187 PG/ML

## 2022-10-26 ENCOUNTER — OFFICE VISIT (OUTPATIENT)
Dept: PRIMARY CARE CLINIC | Facility: CLINIC | Age: 42
End: 2022-10-26

## 2022-10-26 ENCOUNTER — LAB VISIT (OUTPATIENT)
Dept: LAB | Facility: HOSPITAL | Age: 42
End: 2022-10-26
Attending: INTERNAL MEDICINE

## 2022-10-26 VITALS
TEMPERATURE: 98 F | SYSTOLIC BLOOD PRESSURE: 135 MMHG | DIASTOLIC BLOOD PRESSURE: 82 MMHG | BODY MASS INDEX: 36.97 KG/M2 | WEIGHT: 243.19 LBS | OXYGEN SATURATION: 97 % | HEART RATE: 85 BPM

## 2022-10-26 DIAGNOSIS — N18.32 STAGE 3B CHRONIC KIDNEY DISEASE (CKD): ICD-10-CM

## 2022-10-26 DIAGNOSIS — I16.1 HYPERTENSIVE EMERGENCY: Primary | ICD-10-CM

## 2022-10-26 DIAGNOSIS — I50.22 CHRONIC SYSTOLIC HEART FAILURE: ICD-10-CM

## 2022-10-26 LAB
ANION GAP SERPL CALC-SCNC: 10 MMOL/L (ref 8–16)
BUN SERPL-MCNC: 24 MG/DL (ref 6–20)
CALCIUM SERPL-MCNC: 9.6 MG/DL (ref 8.7–10.5)
CHLORIDE SERPL-SCNC: 108 MMOL/L (ref 95–110)
CO2 SERPL-SCNC: 23 MMOL/L (ref 23–29)
CREAT SERPL-MCNC: 2.1 MG/DL (ref 0.5–1.4)
EST. GFR  (NO RACE VARIABLE): 39 ML/MIN/1.73 M^2
GLUCOSE SERPL-MCNC: 78 MG/DL (ref 70–110)
POTASSIUM SERPL-SCNC: 4.2 MMOL/L (ref 3.5–5.1)
SODIUM SERPL-SCNC: 141 MMOL/L (ref 136–145)

## 2022-10-26 PROCEDURE — 99999 PR PBB SHADOW E&M-EST. PATIENT-LVL IV: CPT | Mod: PBBFAC,,, | Performed by: INTERNAL MEDICINE

## 2022-10-26 PROCEDURE — 99215 OFFICE O/P EST HI 40 MIN: CPT | Mod: S$PBB,,, | Performed by: INTERNAL MEDICINE

## 2022-10-26 PROCEDURE — 36415 COLL VENOUS BLD VENIPUNCTURE: CPT | Performed by: INTERNAL MEDICINE

## 2022-10-26 PROCEDURE — 99214 OFFICE O/P EST MOD 30 MIN: CPT | Mod: PBBFAC,PO | Performed by: INTERNAL MEDICINE

## 2022-10-26 PROCEDURE — 99999 PR PBB SHADOW E&M-EST. PATIENT-LVL IV: ICD-10-PCS | Mod: PBBFAC,,, | Performed by: INTERNAL MEDICINE

## 2022-10-26 PROCEDURE — 80048 BASIC METABOLIC PNL TOTAL CA: CPT | Performed by: INTERNAL MEDICINE

## 2022-10-26 PROCEDURE — 99215 PR OFFICE/OUTPT VISIT, EST, LEVL V, 40-54 MIN: ICD-10-PCS | Mod: S$PBB,,, | Performed by: INTERNAL MEDICINE

## 2022-10-26 NOTE — PROGRESS NOTES
[facility]   New Visit Progress Note   Recent Hospital Discharge     PRESENTING HISTORY     Chief Complaint/Reason for Admission:  Follow up Hospital Discharge   PCP: Primary Doctor No    History of Present Illness:  Mr. Abe Pak is a 41 y.o. male who was recently admitted to the hospital.    [facility] Medicine Discharge Summary  Primary Team: [facility] Hospitalist Team A  Attending Physician: Nuzhat Patel MD  Resident: Brandt  Intern: Jg  Date of Admit: 10/7/2022  Date of Discharge: 10/10/2022  Discharge to: Home  Condition: Stable  ___________________________________________________________________    Today:  [facility] follow up.  Recently hospitalized for management of hypertensive emergency and GILMER.   Required Nitroglycerin infusion and ICU level of care.  Aggressive oral antihypertension regimen was initiated with control of blood pressure of the subsequent 48 hours.  TTE with EF 30%.  Patient seen in consultation with Cardiology team-> he will need outpatient ischemic work up.  Home medication regimen adjusted.  Patient discharged to home.     Patient unaccompanied today.  Ambulatory and independent with ADL's.  Reports compliance with all medication.  Following sodium restricted diet.  Has returned to work without difficulty.  No chest pain, dyspnea, or edema since hospital discharge.     Review of Systems  General ROS: negative for chills, fever or weight loss  Psychological ROS: negative for hallucination, depression or suicidal ideation  Ophthalmic ROS: negative for blurry vision, photophobia or eye pain  ENT ROS: negative for epistaxis, sore throat or rhinorrhea  Respiratory ROS: no cough, shortness of breath, or wheezing  Cardiovascular ROS: no chest pain or dyspnea on exertion  Gastrointestinal ROS: no abdominal pain, change in bowel habits, or black/ bloody stools  Genito-Urinary ROS: no dysuria, trouble voiding, or hematuria  Musculoskeletal  ROS: negative for gait disturbance or muscular weakness  Neurological ROS: no syncope or seizures; no ataxia  Dermatological ROS: negative for pruritis, rash and jaundice      PAST HISTORY:     No past medical history on file.    No past surgical history on file.    No family history on file.      MEDICATIONS & ALLERGIES:     Current Outpatient Medications on File Prior to Visit   Medication Sig Dispense Refill    amLODIPine (NORVASC) 10 MG tablet Take 1 tablet (10 mg total) by mouth once daily. 90 tablet 3    atorvastatin (LIPITOR) 40 MG tablet Take 1 tablet (40 mg total) by mouth once daily. 90 tablet 3    carvediloL (COREG) 6.25 MG tablet Take 1 tablet (6.25 mg total) by mouth 2 (two) times daily. 180 tablet 3    furosemide (LASIX) 40 MG tablet Take 1 tablet (40 mg total) by mouth once daily. 90 tablet 3    hydrALAZINE (APRESOLINE) 100 MG tablet Take 1 tablet (100 mg total) by mouth every 8 (eight) hours. 270 tablet 3    isosorbide dinitrate (ISORDIL) 20 MG tablet Take 1 tablet (20 mg total) by mouth 3 (three) times daily. 270 tablet 3    valsartan (DIOVAN) 40 MG tablet Take 2 tablets (80 mg total) by mouth once daily. 180 tablet 3     No current facility-administered medications on file prior to visit.        Review of patient's allergies indicates:  No Known Allergies    OBJECTIVE:     Vital Signs:  /82 (BP Location: Left arm, Patient Position: Sitting, BP Method: Medium (Automatic))   Pulse 85   Temp 98.3 °F (36.8 °C) (Oral)   Wt 110.3 kg (243 lb 2.7 oz)   SpO2 97%   BMI 36.97 kg/m²   Wt Readings from Last 3 Encounters:   10/26/22 1335 110.3 kg (243 lb 2.7 oz)   10/09/22 0330 108.5 kg (239 lb 3.2 oz)   10/08/22 1215 113.9 kg (251 lb)   10/07/22 1616 114 kg (251 lb 5.2 oz)   10/07/22 1040 117.9 kg (260 lb)   12/27/15 0933 104.3 kg (230 lb)     Body mass index is 36.97 kg/m².        Physical Exam:  /82 (BP Location: Left arm, Patient Position: Sitting, BP Method: Medium (Automatic))   Pulse 85    Temp 98.3 °F (36.8 °C) (Oral)   Wt 110.3 kg (243 lb 2.7 oz)   SpO2 97%   BMI 36.97 kg/m²   General appearance: alert, cooperative, no distress  Constitutional:Oriented to person, place, and time  + obese    HEENT: Normocephalic, atraumatic, neck symmetrical, no nasal discharge   Eyes: conjunctivae/corneas clear, PERRL, EOM's intact  Lungs: clear to auscultation bilaterally, no dullness to percussion bilaterally  Heart: regular rate and rhythm without rub; no displacement of the PMI   Abdomen: soft, non-tender; bowel sounds normoactive; no organomegaly  Extremities: extremities symmetric; no clubbing, cyanosis, or edema  Integument: Skin color, texture, turgor normal; no rashes; hair distrubution normal  Neurologic: Alert and oriented X 3, normal strength, normal coordination and gait  Psychiatric: no pressured speech; normal affect; no evidence of impaired cognition     Laboratory  Lab Results   Component Value Date    WBC 11.89 10/10/2022    HGB 12.6 (L) 10/10/2022    HCT 39.5 (L) 10/10/2022    MCV 91 10/10/2022     (H) 10/10/2022     BMP  Lab Results   Component Value Date     10/10/2022    K 3.7 10/10/2022    CL 98 10/10/2022    CO2 27 10/10/2022    BUN 36 (H) 10/10/2022    CREATININE 2.8 (H) 10/10/2022    CALCIUM 9.9 10/10/2022    ANIONGAP 12 10/10/2022    ESTGFRAFRICA >60 12/27/2015    EGFRNONAA >60 12/27/2015     Lab Results   Component Value Date    ALT 38 10/10/2022    AST 28 10/10/2022    ALKPHOS 73 10/10/2022    BILITOT 0.9 10/10/2022     No results found for: INR, PROTIME  Lab Results   Component Value Date    HGBA1C 4.8 10/08/2022       Diagnostic Results:    US Renal Artery Stenosis 10/8/22:  No evidence of renal artery stenosis..    Chest X Ray 10/7/22:  Findings concerning for multifocal pneumonia from infectious or inflammatory process versus aspiration in the proper clinical setting, right more so than the left.  Interval enlarged cardiac silhouette.    TTE 10/8/22:  The left  ventricle is moderately enlarged with moderate concentric hypertrophy and severely decreased systolic function.  The estimated ejection fraction is 30%.  There is severe left ventricular global hypokinesis.  Mild mitral regurgitation.  Mild tricuspid regurgitation.  Mild pulmonic regurgitation.  Normal right ventricular size with normal right ventricular systolic function.  There is no pulmonary hypertension.  Small anterior pericardial effusion.       ASSESSMENT & PLAN:     Hypertensive emergency  - hypertension under better control now, but on multi drug regimen  - continue current medication regimen  - continue sodium restriction  - weight loss    Chronic systolic heart failure  - new diagnosis  - continue current medication regimen  - see Cardiology at [facility]- 11/17/22 @ 2 pm- Dr Joao Hughes     Stage 3b chronic kidney disease (CKD)  - labs today   - will need Nephrology care  - defer to [facility] team   -     Basic Metabolic Panel; Future; Expected date: 10/26/2022    Patient will be released from [facility].  He will see Cardiology team at [facility] and will also need to establish primary care with [facility].     Instructions for the patient:      Scheduled Follow-up :  Future Appointments   Date Time Provider Department Center   10/26/2022  1:30 PM Kellen Gonzales MD [facility]       Post Visit Medication List:     Medication List            Accurate as of October 26, 2022  2:24 PM. If you have any questions, ask your nurse or doctor.                CONTINUE taking these medications      amLODIPine 10 MG tablet  Commonly known as: NORVASC  Take 1 tablet (10 mg total) by mouth once daily.     atorvastatin 40 MG tablet  Commonly known as: LIPITOR  Take 1 tablet (40 mg total) by mouth once daily.     carvediloL 6.25 MG tablet  Commonly known as: COREG  Take 1 tablet (6.25 mg total) by mouth 2 (two) times daily.     furosemide 40 MG tablet  Commonly known as: LASIX  Take 1 tablet (40 mg total) by mouth once  daily.     hydrALAZINE 100 MG tablet  Commonly known as: APRESOLINE  Take 1 tablet (100 mg total) by mouth every 8 (eight) hours.     isosorbide dinitrate 20 MG tablet  Commonly known as: ISORDIL  Take 1 tablet (20 mg total) by mouth 3 (three) times daily.     valsartan 40 MG tablet  Commonly known as: DIOVAN  Take 2 tablets (80 mg total) by mouth once daily.              Signing Physician:  Kellen Gonzales MD

## 2022-10-27 ENCOUNTER — TELEPHONE (OUTPATIENT)
Dept: PRIMARY CARE CLINIC | Facility: CLINIC | Age: 42
End: 2022-10-27

## 2022-10-27 NOTE — TELEPHONE ENCOUNTER
----- Message from Kellen Gonzales MD sent at 10/27/2022  8:42 AM CDT -----  Please let him know electrolytes look good, but renal function is still impaired. Please see referral to [facility] Ne[facility] and assist with getting him an appt- let patient know we are doing this.

## 2022-10-27 NOTE — TELEPHONE ENCOUNTER
Notified patient of lab results. Notified patient that we will fax over a referral to [facility] and assist in getting an appointment due to renal function being impaired. Patient verbalized understanding. Informed patient that we will contact again with date and time of appointment at [facility].

## 2022-12-05 ENCOUNTER — TELEPHONE (OUTPATIENT)
Dept: CARDIOLOGY | Facility: HOSPITAL | Age: 42
End: 2022-12-05

## 2023-03-21 NOTE — MEDICAL/APP STUDENT
[facility] Internal Medicine Progress Note    Primary Team: [facility] Hospital Medicine A  Attending Physician: Nuzhat Patel MD  Resident: Sumeet Carlton MD  Intern: Elle Gregorio MD      Subjective:      NAEON. Denies any complaints including no CP, SOB. Pt states he feels much better than yesterday.     Objective:   Last 24 Hour Vital Signs:  BP  Min: 110/75  Max: 192/123  Temp  Av.8 °F (36.6 °C)  Min: 96.6 °F (35.9 °C)  Max: 98.8 °F (37.1 °C)  Pulse  Av.6  Min: 92  Max: 108  Resp  Av.3  Min: 11  Max: 41  SpO2  Av.1 %  Min: 92 %  Max: 98 %  I/O last 3 completed shifts:  In: 1151.2 [P.O.:1110; I.V.:41.2]  Out: 3775 [Urine:3775]    Physical Examination:  General:  Well-developed, well-nourished in no apparent distress  HEENT:  Normocephalic, atraumatic, no discharge from either eye, no scleral icterus  Neck: Normal range of motion  Respiratory: Clear to auscultation bilaterally with no wheezes, rales, or rhonchi, symmetrical chest expansion with no increased work of breathing  Cardiovascular: Normal rate, regular rhythm and normal heart sounds with no murmurs, rubs, or gallops  Abdominal: Soft, non-tender, non-distended  Musculoskeletal: Normal range of motion in all extremities  Neurological: AAO x 4  Extremities and Skin: Skin is warm and dry, no rashes or lesions appreciated  Psychiatric: Normal mood and affect, normal behavior    Laboratory:  Laboratory Data   Recent Results (from the past 12 hour(s))   Comprehensive metabolic panel    Collection Time: 10/10/22  2:31 AM   Result Value Ref Range    Sodium 137 136 - 145 mmol/L    Potassium 3.7 3.5 - 5.1 mmol/L    Chloride 98 95 - 110 mmol/L    CO2 27 23 - 29 mmol/L    Glucose 109 70 - 110 mg/dL    BUN 36 (H) 6 - 20 mg/dL    Creatinine 2.8 (H) 0.5 - 1.4 mg/dL    Calcium 9.9 8.7 - 10.5 mg/dL    Total Protein 7.2 6.0 - 8.4 g/dL    Albumin 3.3 (L) 3.5 - 5.2 g/dL    Total Bilirubin 0.9 0.1 - 1.0 mg/dL    Alkaline Phosphatase 73 55 - 135 U/L    AST 28 10 - 40  U/L    ALT 38 10 - 44 U/L    Anion Gap 12 8 - 16 mmol/L    eGFR 28 (A) >60 mL/min/1.73 m^2   CBC auto differential    Collection Time: 10/10/22  2:31 AM   Result Value Ref Range    WBC 11.89 3.90 - 12.70 K/uL    RBC 4.34 (L) 4.60 - 6.20 M/uL    Hemoglobin 12.6 (L) 14.0 - 18.0 g/dL    Hematocrit 39.5 (L) 40.0 - 54.0 %    MCV 91 82 - 98 fL    MCH 29.0 27.0 - 31.0 pg    MCHC 31.9 (L) 32.0 - 36.0 g/dL    RDW 13.4 11.5 - 14.5 %    Platelets 554 (H) 150 - 450 K/uL    MPV 8.7 (L) 9.2 - 12.9 fL    Immature Granulocytes 0.3 0.0 - 0.5 %    Gran # (ANC) 9.0 (H) 1.8 - 7.7 K/uL    Immature Grans (Abs) 0.04 0.00 - 0.04 K/uL    Lymph # 1.9 1.0 - 4.8 K/uL    Mono # 0.8 0.3 - 1.0 K/uL    Eos # 0.1 0.0 - 0.5 K/uL    Baso # 0.02 0.00 - 0.20 K/uL    nRBC 0 0 /100 WBC    Gran % 76.0 (H) 38.0 - 73.0 %    Lymph % 16.1 (L) 18.0 - 48.0 %    Mono % 6.4 4.0 - 15.0 %    Eosinophil % 1.0 0.0 - 8.0 %    Basophil % 0.2 0.0 - 1.9 %    Differential Method Automated        Microbiology Data   Microbiology Results (last 7 days)       ** No results found for the last 168 hours. **            Other Results:  EKG   Results for orders placed or performed during the hospital encounter of 10/07/22   EKG 12-lead    Collection Time: 10/07/22 10:47 AM    Narrative    Test Reason : R07.9,    Vent. Rate : 117 BPM     Atrial Rate : 117 BPM     P-R Int : 146 ms          QRS Dur : 096 ms      QT Int : 308 ms       P-R-T Axes : 000 203 080 degrees     QTc Int : 429 ms    Sinus tachycardia  Right superior axis deviation  LVH with secondary ST-T wave changes  Abnormal ECG  No previous ECGs available  Confirmed by Khadra Rodriguez MD ([id number]) on 10/7/2022 3:33:11 PM    Referred By: VIDYA   SELF           Confirmed By:Khadra Rodriguez MD       Radiology Data   X-Ray Chest 1 View    Result Date: 10/7/2022  EXAMINATION: XR CHEST 1 VIEW CLINICAL HISTORY: Shortness of breath; TECHNIQUE: Single frontal view of the chest was performed. COMPARISON: Chest radiograph 04/17/2008  FINDINGS: Monitoring leads overlie the chest.  Patient is slightly rotated.  Large body habitus. Mediastinal structures are midline.  The heart is now mild to moderately enlarged which may reflect cardiomegaly and/or pericardial fluid.  Mediastinal contours are within normal limits.  Hilar contours are within normal limits.  Similar slight nonspecific elevation of the right hemidiaphragm. The lungs are well expanded.  Patchy opacities noted throughout the right mid to lower lung zone and to a lesser extent left lung base.  No pleural effusion or pneumothorax.  No acute osseous process seen.  PA and lateral views can be obtained.     Findings concerning for multifocal pneumonia from infectious or inflammatory process versus aspiration in the proper clinical setting, right more so than the left. Interval enlarged cardiac silhouette. Electronically signed by: Lv Hung MD Date:    10/07/2022 Time:    12:16    US Renal Artery Stenosis Hyperten (xpd)    Result Date: 10/9/2022  EXAMINATION: US RENAL ARTERY STENOSIS HYPERTEN (XPD) CLINICAL HISTORY: Secondary Hypertension work up, evaluation for renal artery stenosis; TECHNIQUE: Ultrasound Doppler renal artery stenosis. COMPARISON: None. FINDINGS: The aortic velocity is 95 cm/s.  Urinary bladder is partially fluid-filled and unremarkable.  Prostate measures approximately 3.8 x 3.7 x 4.2 cm. RIGHT: Right kidney measures 9.1 cm.  No hydronephrosis.  Right renal vein is patent. Right renal resistive indices: Upper: 0.62 Mid: 0.57 Lower: 0.56 Maximum right renal artery/aorta ratio: 0.44 Highest renal artery velocity: 42 cm/sec (noting some limitation at the origin given bowel gas) LEFT: Left kidney measures 10.9 cm.  No hydronephrosis.  Left renal vein is patent. Left renal resistive indices: Upper: 0.55 Mid: 0.55 Lower: 0.78 Maximum left renal artery/aorta ratio: 0.46 Highest renal artery velocity: 44 cm/sec     No evidence of renal artery stenosis.. Electronically signed  by: Lake Ferro Date:    10/09/2022 Time:    07:42    Echo Saline Bubble? No    Result Date: 10/8/2022  · The left ventricle is moderately enlarged with moderate concentric hypertrophy and severely decreased systolic function. · The estimated ejection fraction is 30%. · There is severe left ventricular global hypokinesis. · Mild mitral regurgitation. · Mild tricuspid regurgitation. · Mild pulmonic regurgitation. · Normal right ventricular size with normal right ventricular systolic function. · There is no pulmonary hypertension. · Small anterior pericardial effusion.         Current Medications:     Infusions:       Scheduled:   amLODIPine  10 mg Oral Daily    carvediloL  6.25 mg Oral BID    enoxaparin  40 mg Subcutaneous Daily    hydrALAZINE  100 mg Oral Q8H    mupirocin   Nasal BID    valsartan  40 mg Oral BID        PRN:  influenza, pneumoc 20-haile conj-dip cr(PF), sars-cov-2 (covid-19), sodium chloride 0.9%    Assessment:     Abe Pak is a 41 y.o. male with no past medical history who presents for shortness of breath, and chest tightness for 2 weeks. Patient's Bp was in the 200s/130s in the ED and labetalol, lasix, and metoprolol was administered.  Patient was initially admitted to the ICU for nitroglycerin drip and was transitioned to oral antihypertensives with adequate blood pressure control.  On further evaluation found to have EF 30% and initiated on guideline directed medical therapy.     Plan:     Hypertensive emergency  Chest pain with elevated troponin  - SOB, CP worsening for 2 weeks  - In the ED patient's Bp was in the 200s/130s  Given labetalol, lasix, and metoprolol with improvement in symptoms  - Troponin 0.079>>0.095>>0.083    -   -   - EKG Sinus tachycardia, R axis deviation, LVH with secondary ST T wave changes  - CXR concerning for PNA vs aspiration R>L, no pleural effusion, enlarged cardiac sillouette  - Echo revealed global LV hypokinesis with EF 30%  - Hydralazine  100 mg q8h, coreg 6.25 BID, Norvasc 10 daily, valsartan 40 bid  - Atorvastatin 40 and will initiate SGLT2 on discharge     Heart failure with reduced EF, new diagnosis  - Echo revealed EF 30% and global LV hypokinesis with severely decreased systolic function  - BP control as noted above  - Consult cardiology  - ASA 81 daily  - initiated on valsartan, Coreg, lipitor, and will initiate SGLT2 on discharge     Anemia of Chronic Disease  - H/H 11.4/36.2 MCV 93  - TIBC WNL, saturation 7% with a low iron  - Ferritin normal 185     GILMER  - BUN 23 Cr 2.2 baseline ~1  - FeNa 0.4% FeUrea >35% prerenal etiology  - Patient appears hypervolemic on exam JVP 8-9, CXR with patchy opacities, GILMER possibly from HF etiology  - Cr 2.8, may be new baseline. Continue to monitor in the outpatient     Healthcare Maintenance  - Flu, Prevnar, Covid shots prior to discharge  - HbA1c 4.8  - Lipid panel  - Establish with PCP     Diet: Cardiac  DVT Prophylaxis: lovenox  IVF: None  Code: Full     Dispo: likely discharge today with PCP and Cardiology follow up    Lei Rico MSEmily  10/10/2022 8:53 AM    [facility] Medicine Hospitalist Pager numbers:   [facility] Hospitalist Medicine Team A (Jorge/Leon): [phone]  [facility] Hospitalist Medicine Team B (Aimee/Noel):  [phone]   none

## 2023-08-24 DIAGNOSIS — N18.32 STAGE 3B CHRONIC KIDNEY DISEASE (CKD): Primary | ICD-10-CM

## 2023-08-28 ENCOUNTER — TELEPHONE (OUTPATIENT)
Dept: NEPHROLOGY | Facility: CLINIC | Age: 43
End: 2023-08-28

## 2023-09-14 ENCOUNTER — TELEPHONE (OUTPATIENT)
Dept: NEPHROLOGY | Facility: CLINIC | Age: 43
End: 2023-09-14

## 2023-09-14 NOTE — TELEPHONE ENCOUNTER
----- Message from Uziel Umanzor MD sent at 9/14/2023  1:46 PM CDT -----  Pt didn't show to the get labs done? Please reschedule    ----- Message -----  From: Sara Parr LPN  Sent: 9/13/2023  11:19 AM CDT  To: Uziel Umanzor MD    9-14-23.  ----- Message -----  From: Uziel Umanzor MD  Sent: 9/13/2023  10:39 AM CDT  To: Sara Parr LPN    Do you know when is he getting them?    ----- Message -----  From: Sara Parr LPN  Sent: 9/7/2023   4:43 PM CDT  To: Uziel Umanzor MD    done  ----- Message -----  From: Uziel Umanzor MD  Sent: 9/7/2023   3:53 PM CDT  To: Sara Parr LPN    Please insist calling this pt to get his labs done

## 2023-10-05 ENCOUNTER — TELEPHONE (OUTPATIENT)
Dept: NEPHROLOGY | Facility: CLINIC | Age: 43
End: 2023-10-05

## 2023-10-05 NOTE — TELEPHONE ENCOUNTER
----- Message from Sirisha Adams sent at 10/5/2023  9:58 AM CDT -----  Regarding: New Labs  Cheryl Ruiz,    I was wondering if you could try one last time to get a hold of this patient to reschedule his SOC labs that Dr. Umanzor ordered? He was scheduled for 9.14.23 but missed his appointment. We would really appreciate it!    Thank you,  Sirisha

## 2024-11-18 NOTE — H&P
[facility] Medicine H&P Note     Admitting Team: [facility] Hospitalist Team A  Attending Physician: Jorge  Resident: Brandt  Intern: Jg    Date of Admit: 10/7/2022    Chief Complaint     SOB and chest tightness x 2 weeks    Subjective:      History of Present Illness:  Abe Pak is a 41 y.o. male with no past medical history who presents for shortness of breath, and chest tightness for 2 weeks. Pt was in his usual state of health when this SOB and chest tightness began spontaneously. It continued to worsen until the day of presentation when he was going to work as a  and the severity of his symptoms lead to him to get checked out in the ER. He states it feels like someone is standing on his chest and it is improved when he is at rest but exacerbated with ambulation. He also states he has been experiencing some diaphoresis and coughing/spitting up yellow sputum. Denies any abdominal pain, headache, blurry vision, nausea, green/foul smelling sputum, and has never been homeless or in [facility]. On interview pt is feeling slightly improved after getting the labetalol, lasix, and metoprolol in the ED.    Past Medical History:  History reviewed. No pertinent past medical history.    Past Surgical History:  History reviewed. No pertinent surgical history.    Allergies:  Review of patient's allergies indicates:  No Known Allergies    Home Medications:  None    Family History:  History reviewed. No pertinent family history.    Social History:  Details cars, lives with his mother  Denies tobacco use  Denies alcohol use (last drink 6-8 months ago)  Occasional THC use otherwise negative illicit drug use      Review of Systems:  Noted in the HPI. All other systems are reviewed and are negative.    Health Maintaince :   Primary Care Physician: None    Immunizations:   TDap NUTD  Flu NUTD  Pna NUTD  COVID NUTD    Cancer Screening:  Colonoscopy: NUTD     Objective:   Last 24 Hour Vital Signs:  BP  Min: 174/126   "Max: 213/138  Temp  Av °F (37.2 °C)  Min: 99 °F (37.2 °C)  Max: 99 °F (37.2 °C)  Pulse  Av  Min: 98  Max: 121  Resp  Av.3  Min: 20  Max: 31  SpO2  Av.8 %  Min: 92 %  Max: 100 %  Height  Av' 7" (170.2 cm)  Min: 5' 7" (170.2 cm)  Max: 5' 7" (170.2 cm)  Weight  Av.9 kg (260 lb)  Min: 117.9 kg (260 lb)  Max: 117.9 kg (260 lb)  Body mass index is 40.72 kg/m².  No intake/output data recorded.    Physical Examination:  Physical Exam  Constitutional:       General: He is in acute distress.   HENT:      Head: Normocephalic.      Mouth/Throat:      Mouth: Mucous membranes are moist.   Eyes:      Extraocular Movements: Extraocular movements intact.      Conjunctiva/sclera: Conjunctivae normal.   Neck:      Comments: JVP 8  Cardiovascular:      Rate and Rhythm: Tachycardia present.      Pulses: Normal pulses.      Heart sounds: Normal heart sounds.      Comments: Difficult to obtain heart views on bedside echo  Pulmonary:      Effort: Respiratory distress present.      Breath sounds: No rhonchi or rales.   Chest:      Chest wall: No tenderness.   Abdominal:      General: Abdomen is flat. There is no distension.      Palpations: Abdomen is soft.      Tenderness: There is no abdominal tenderness.   Musculoskeletal:         General: Swelling (2+ bilaterally up to mid shin) present.   Skin:     General: Skin is warm and dry.   Neurological:      General: No focal deficit present.      Mental Status: He is alert and oriented to person, place, and time.     Laboratory:  Most Recent Data:  CBC:   Lab Results   Component Value Date    WBC 11.69 10/07/2022    HGB 11.4 (L) 10/07/2022    HCT 36.2 (L) 10/07/2022     (H) 10/07/2022    MCV 93 10/07/2022    RDW 13.2 10/07/2022     WBC Differential: 80.1 % N, 13.9 % L, 5 % M, 0.4 % Eo, 0.2 % Baso    BMP:   Lab Results   Component Value Date     10/07/2022    K 4.0 10/07/2022     10/07/2022    CO2 23 10/07/2022    BUN 23 (H) 10/07/2022    " CREATININE 2.2 (H) 10/07/2022    GLU 91 10/07/2022    CALCIUM 9.4 10/07/2022     LFTs:   Lab Results   Component Value Date    PROT 6.9 10/07/2022    ALBUMIN 3.5 10/07/2022    BILITOT 0.7 10/07/2022    AST 23 10/07/2022    ALKPHOS 78 10/07/2022    ALT 27 10/07/2022     Coags: No results found for: INR, PROTIME, PTT  FLP: No results found for: CHOL, HDL, LDLCALC, TRIG, CHOLHDL  DM:   Lab Results   Component Value Date    CREATININE 2.2 (H) 10/07/2022     Thyroid: No results found for: TSH, FREET4, T9NARJZ, E6RTOUS, THYROIDAB  Anemia: No results found for: IRON, TIBC, FERRITIN, JLECCYKO65, FOLATE  Cardiac:   Lab Results   Component Value Date    TROPONINI 0.079 (H) 10/07/2022     (H) 10/07/2022     Urinalysis:   Lab Results   Component Value Date    COLORU Yellow 10/07/2022    SPECGRAV 1.030 10/07/2022    NITRITE Negative 10/07/2022    KETONESU Negative 10/07/2022    UROBILINOGEN Negative 10/07/2022    WBCUA 1 10/07/2022       Microbiology Data:  SARS-CoV-2: negative    Other Results:  EKG: Sinus tachycardia, R axis deviation, LVH with secondary ST T wave changes    Radiology:  CXR  Impression:  Findings concerning for multifocal pneumonia from infectious or inflammatory process versus aspiration in the proper clinical setting, right more so than the left.     Interval enlarged cardiac silhouette.       Assessment:     Abe Pak is a 41 y.o. male with no past medical history who presents for shortness of breath, and chest tightness for 2 weeks. Patient's Bp was in the 200s/130s in the ED and labetalol, lasix, and metoprolol was administered. Patient admitted to ICU on nitro drip for hypertensive emergency.    Plan:     Hypertensive emergency  Chest pain with elevated troponin  - SOB, CP worsening for 2 weeks  - In the ED patient's Bp was in the 200s/130s  Given labetalol, lasix, and metoprolol with improvement in symptoms  - Troponin 0.079   -   - EKG Sinus tachycardia, R axis deviation,  LVH with secondary ST T wave changes  - CXR concerning for PNA vs aspiration R>L, no pleural effusion, enlarged cardiac sillouette  - Hydralazine 25mg q8h, furosemide 80mg IV, amlodipine 5mg qd  - TTE  - Trend EKG, troponin  - Nitroglycerin gtt   - Admit to ICU for NTG drip titration     Normocytic Anemia  - H/H 11.4/36.2 MCV 93  - Iron panel, ferritin   - CTM with CBC    GILMER  - BUN 23 Cr 2.2 baseline ~1  - FeNa 0.4% FeUrea >35% prerenal etiology  - Patient appears hypervolemic on exam JVP 8-9, CXR with patchy opacities, GILMER possibly from HF etiology  - Follow up CMP    Healthcare Maintenance  - Flu, Prevnar, Covid shots prior to discharge  - HbA1c  - Lipid panel  - Establish with PCP    Diet: Cardiac  DVT: SCD  IVF: None  Dispo: Admit to ICU for BP control    Code Status:     Full Code    Elle Gregorio MD  [facility] PGY1    [facility] Hospitalist Pager numbers:   [facility] Hospitalist Medicine Team A (Jorge/Leon):   [facility] Hospitalist Medicine Team B (Aimee/Noel):  [phone]        Yes

## 2024-11-21 ENCOUNTER — HOSPITAL ENCOUNTER (INPATIENT)
Facility: HOSPITAL | Age: 44
LOS: 3 days | Discharge: HOME | DRG: 291 | End: 2024-11-24
Attending: EMERGENCY MEDICINE

## 2024-11-21 DIAGNOSIS — R79.89 ELEVATED TROPONIN: Primary | ICD-10-CM

## 2024-11-21 DIAGNOSIS — N18.4 ACUTE RENAL FAILURE SUPERIMPOSED ON STAGE 4 CHRONIC KIDNEY DISEASE, UNSPECIFIED ACUTE RENAL FAILURE TYPE: ICD-10-CM

## 2024-11-21 DIAGNOSIS — I50.21 ACUTE HEART FAILURE WITH REDUCED EJECTION FRACTION (HFREF, <= 40%): ICD-10-CM

## 2024-11-21 DIAGNOSIS — N18.32 STAGE 3B CHRONIC KIDNEY DISEASE (CKD): ICD-10-CM

## 2024-11-21 DIAGNOSIS — I50.22 CHRONIC SYSTOLIC HEART FAILURE: ICD-10-CM

## 2024-11-21 DIAGNOSIS — N17.9 ACUTE RENAL FAILURE SUPERIMPOSED ON STAGE 4 CHRONIC KIDNEY DISEASE, UNSPECIFIED ACUTE RENAL FAILURE TYPE: ICD-10-CM

## 2024-11-21 DIAGNOSIS — R06.02 SOB (SHORTNESS OF BREATH): ICD-10-CM

## 2024-11-21 DIAGNOSIS — I16.1 HYPERTENSIVE EMERGENCY: ICD-10-CM

## 2024-11-21 DIAGNOSIS — R60.0 LEG EDEMA: ICD-10-CM

## 2024-11-21 DIAGNOSIS — R06.00 DYSPNEA: ICD-10-CM

## 2024-11-21 LAB
ALBUMIN SERPL BCP-MCNC: 3.9 G/DL (ref 3.5–5.2)
ALP SERPL-CCNC: 88 U/L (ref 40–150)
ALT SERPL W/O P-5'-P-CCNC: 27 U/L (ref 10–44)
ANION GAP SERPL CALC-SCNC: 12 MMOL/L (ref 8–16)
AST SERPL-CCNC: 23 U/L (ref 10–40)
BASOPHILS # BLD AUTO: 0.01 K/UL (ref 0–0.2)
BASOPHILS # BLD AUTO: 0.02 K/UL (ref 0–0.2)
BASOPHILS NFR BLD: 0.1 % (ref 0–1.9)
BASOPHILS NFR BLD: 0.2 % (ref 0–1.9)
BILIRUB SERPL-MCNC: 0.7 MG/DL (ref 0.1–1)
BNP SERPL-MCNC: 672 PG/ML (ref 0–99)
BUN SERPL-MCNC: 31 MG/DL (ref 6–20)
CALCIUM SERPL-MCNC: 9.6 MG/DL (ref 8.7–10.5)
CHLORIDE SERPL-SCNC: 107 MMOL/L (ref 95–110)
CO2 SERPL-SCNC: 20 MMOL/L (ref 23–29)
CREAT SERPL-MCNC: 2.3 MG/DL (ref 0.5–1.4)
DIFFERENTIAL METHOD BLD: ABNORMAL
DIFFERENTIAL METHOD BLD: ABNORMAL
EOSINOPHIL # BLD AUTO: 0 K/UL (ref 0–0.5)
EOSINOPHIL # BLD AUTO: 0.1 K/UL (ref 0–0.5)
EOSINOPHIL NFR BLD: 0.2 % (ref 0–8)
EOSINOPHIL NFR BLD: 1 % (ref 0–8)
ERYTHROCYTE [DISTWIDTH] IN BLOOD BY AUTOMATED COUNT: 12.3 % (ref 11.5–14.5)
ERYTHROCYTE [DISTWIDTH] IN BLOOD BY AUTOMATED COUNT: 12.4 % (ref 11.5–14.5)
EST. GFR  (NO RACE VARIABLE): 35 ML/MIN/1.73 M^2
ESTIMATED AVG GLUCOSE: 88 MG/DL (ref 68–131)
GLUCOSE SERPL-MCNC: 99 MG/DL (ref 70–110)
HBA1C MFR BLD: 4.7 % (ref 4–5.6)
HCT VFR BLD AUTO: 39 % (ref 40–54)
HCT VFR BLD AUTO: 40.5 % (ref 40–54)
HGB BLD-MCNC: 12.7 G/DL (ref 14–18)
HGB BLD-MCNC: 13.1 G/DL (ref 14–18)
IMM GRANULOCYTES # BLD AUTO: 0.02 K/UL (ref 0–0.04)
IMM GRANULOCYTES # BLD AUTO: 0.04 K/UL (ref 0–0.04)
IMM GRANULOCYTES NFR BLD AUTO: 0.2 % (ref 0–0.5)
IMM GRANULOCYTES NFR BLD AUTO: 0.3 % (ref 0–0.5)
LACTATE SERPL-SCNC: 1.2 MMOL/L (ref 0.5–2.2)
LYMPHOCYTES # BLD AUTO: 0.7 K/UL (ref 1–4.8)
LYMPHOCYTES # BLD AUTO: 1.5 K/UL (ref 1–4.8)
LYMPHOCYTES NFR BLD: 18.3 % (ref 18–48)
LYMPHOCYTES NFR BLD: 6.4 % (ref 18–48)
MCH RBC QN AUTO: 29.8 PG (ref 27–31)
MCH RBC QN AUTO: 30.2 PG (ref 27–31)
MCHC RBC AUTO-ENTMCNC: 32.3 G/DL (ref 32–36)
MCHC RBC AUTO-ENTMCNC: 32.6 G/DL (ref 32–36)
MCV RBC AUTO: 92 FL (ref 82–98)
MCV RBC AUTO: 93 FL (ref 82–98)
MONOCYTES # BLD AUTO: 0.5 K/UL (ref 0.3–1)
MONOCYTES # BLD AUTO: 0.5 K/UL (ref 0.3–1)
MONOCYTES NFR BLD: 4.3 % (ref 4–15)
MONOCYTES NFR BLD: 5.9 % (ref 4–15)
NEUTROPHILS # BLD AUTO: 10.3 K/UL (ref 1.8–7.7)
NEUTROPHILS # BLD AUTO: 6.2 K/UL (ref 1.8–7.7)
NEUTROPHILS NFR BLD: 74.5 % (ref 38–73)
NEUTROPHILS NFR BLD: 88.6 % (ref 38–73)
NRBC BLD-RTO: 0 /100 WBC
NRBC BLD-RTO: 0 /100 WBC
PLATELET # BLD AUTO: 271 K/UL (ref 150–450)
PLATELET # BLD AUTO: 292 K/UL (ref 150–450)
PMV BLD AUTO: 8.8 FL (ref 9.2–12.9)
PMV BLD AUTO: 9.3 FL (ref 9.2–12.9)
POTASSIUM SERPL-SCNC: 3.7 MMOL/L (ref 3.5–5.1)
PROT SERPL-MCNC: 7.1 G/DL (ref 6–8.4)
RBC # BLD AUTO: 4.21 M/UL (ref 4.6–6.2)
RBC # BLD AUTO: 4.4 M/UL (ref 4.6–6.2)
SODIUM SERPL-SCNC: 139 MMOL/L (ref 136–145)
T4 FREE SERPL-MCNC: 1.07 NG/DL (ref 0.71–1.51)
TROPONIN I SERPL DL<=0.01 NG/ML-MCNC: 0.06 NG/ML (ref 0–0.03)
TSH SERPL DL<=0.005 MIU/L-ACNC: 0.94 UIU/ML (ref 0.4–4)
WBC # BLD AUTO: 11.58 K/UL (ref 3.9–12.7)
WBC # BLD AUTO: 8.31 K/UL (ref 3.9–12.7)

## 2024-11-21 PROCEDURE — 63600175 PHARM REV CODE 636 W HCPCS

## 2024-11-21 PROCEDURE — 99285 EMERGENCY DEPT VISIT HI MDM: CPT | Mod: 25

## 2024-11-21 PROCEDURE — 96366 THER/PROPH/DIAG IV INF ADDON: CPT

## 2024-11-21 PROCEDURE — 83880 ASSAY OF NATRIURETIC PEPTIDE: CPT | Performed by: PHYSICIAN ASSISTANT

## 2024-11-21 PROCEDURE — 96365 THER/PROPH/DIAG IV INF INIT: CPT

## 2024-11-21 PROCEDURE — 25000003 PHARM REV CODE 250

## 2024-11-21 PROCEDURE — 83605 ASSAY OF LACTIC ACID: CPT

## 2024-11-21 PROCEDURE — 85025 COMPLETE CBC W/AUTO DIFF WBC: CPT | Performed by: PHYSICIAN ASSISTANT

## 2024-11-21 PROCEDURE — 83036 HEMOGLOBIN GLYCOSYLATED A1C: CPT

## 2024-11-21 PROCEDURE — 84439 ASSAY OF FREE THYROXINE: CPT

## 2024-11-21 PROCEDURE — 20000000 HC ICU ROOM

## 2024-11-21 PROCEDURE — 96375 TX/PRO/DX INJ NEW DRUG ADDON: CPT

## 2024-11-21 PROCEDURE — 84443 ASSAY THYROID STIM HORMONE: CPT

## 2024-11-21 PROCEDURE — 36415 COLL VENOUS BLD VENIPUNCTURE: CPT

## 2024-11-21 PROCEDURE — 84484 ASSAY OF TROPONIN QUANT: CPT | Performed by: PHYSICIAN ASSISTANT

## 2024-11-21 PROCEDURE — 93010 ELECTROCARDIOGRAM REPORT: CPT | Mod: ,,, | Performed by: INTERNAL MEDICINE

## 2024-11-21 PROCEDURE — 93005 ELECTROCARDIOGRAM TRACING: CPT

## 2024-11-21 PROCEDURE — 85025 COMPLETE CBC W/AUTO DIFF WBC: CPT | Mod: 91

## 2024-11-21 PROCEDURE — 80053 COMPREHEN METABOLIC PANEL: CPT | Performed by: PHYSICIAN ASSISTANT

## 2024-11-21 PROCEDURE — 63600175 PHARM REV CODE 636 W HCPCS: Performed by: PHYSICIAN ASSISTANT

## 2024-11-21 PROCEDURE — 99900035 HC TECH TIME PER 15 MIN (STAT)

## 2024-11-21 PROCEDURE — 96372 THER/PROPH/DIAG INJ SC/IM: CPT

## 2024-11-21 RX ORDER — HEPARIN SODIUM 5000 [USP'U]/ML
5000 INJECTION, SOLUTION INTRAVENOUS; SUBCUTANEOUS EVERY 8 HOURS
Status: DISCONTINUED | OUTPATIENT
Start: 2024-11-21 | End: 2024-11-24 | Stop reason: HOSPADM

## 2024-11-21 RX ORDER — NICARDIPINE HYDROCHLORIDE 0.2 MG/ML
0-15 INJECTION INTRAVENOUS CONTINUOUS
Status: DISCONTINUED | OUTPATIENT
Start: 2024-11-21 | End: 2024-11-22

## 2024-11-21 RX ORDER — METOPROLOL TARTRATE 1 MG/ML
5 INJECTION, SOLUTION INTRAVENOUS EVERY 6 HOURS PRN
Status: DISCONTINUED | OUTPATIENT
Start: 2024-11-21 | End: 2024-11-24 | Stop reason: HOSPADM

## 2024-11-21 RX ORDER — OXYCODONE HYDROCHLORIDE 5 MG/1
10 TABLET ORAL EVERY 4 HOURS PRN
Status: DISCONTINUED | OUTPATIENT
Start: 2024-11-21 | End: 2024-11-24 | Stop reason: HOSPADM

## 2024-11-21 RX ORDER — FUROSEMIDE 10 MG/ML
60 INJECTION INTRAMUSCULAR; INTRAVENOUS
Status: COMPLETED | OUTPATIENT
Start: 2024-11-21 | End: 2024-11-21

## 2024-11-21 RX ORDER — AMLODIPINE BESYLATE 5 MG/1
5 TABLET ORAL DAILY
Status: DISCONTINUED | OUTPATIENT
Start: 2024-11-21 | End: 2024-11-21

## 2024-11-21 RX ORDER — METOPROLOL SUCCINATE 25 MG/1
25 TABLET, EXTENDED RELEASE ORAL DAILY
Status: DISCONTINUED | OUTPATIENT
Start: 2024-11-21 | End: 2024-11-21

## 2024-11-21 RX ORDER — AMLODIPINE BESYLATE 5 MG/1
5 TABLET ORAL
Status: DISCONTINUED | OUTPATIENT
Start: 2024-11-21 | End: 2024-11-21

## 2024-11-21 RX ORDER — SODIUM,POTASSIUM PHOSPHATES 280-250MG
2 POWDER IN PACKET (EA) ORAL
Status: DISCONTINUED | OUTPATIENT
Start: 2024-11-21 | End: 2024-11-24 | Stop reason: HOSPADM

## 2024-11-21 RX ORDER — SODIUM CHLORIDE 0.9 % (FLUSH) 0.9 %
10 SYRINGE (ML) INJECTION
Status: DISCONTINUED | OUTPATIENT
Start: 2024-11-21 | End: 2024-11-24 | Stop reason: HOSPADM

## 2024-11-21 RX ORDER — CARVEDILOL 3.12 MG/1
6.25 TABLET ORAL 2 TIMES DAILY
Status: DISCONTINUED | OUTPATIENT
Start: 2024-11-21 | End: 2024-11-21

## 2024-11-21 RX ORDER — NAPROXEN SODIUM 220 MG/1
81 TABLET, FILM COATED ORAL DAILY
Status: DISCONTINUED | OUTPATIENT
Start: 2024-11-21 | End: 2024-11-24 | Stop reason: HOSPADM

## 2024-11-21 RX ORDER — HYDRALAZINE HYDROCHLORIDE 20 MG/ML
10 INJECTION INTRAMUSCULAR; INTRAVENOUS
Status: COMPLETED | OUTPATIENT
Start: 2024-11-21 | End: 2024-11-21

## 2024-11-21 RX ORDER — POTASSIUM CHLORIDE 20 MEQ/1
40 TABLET, EXTENDED RELEASE ORAL ONCE
Status: COMPLETED | OUTPATIENT
Start: 2024-11-21 | End: 2024-11-21

## 2024-11-21 RX ORDER — CARVEDILOL 25 MG/1
25 TABLET ORAL 2 TIMES DAILY
Status: DISCONTINUED | OUTPATIENT
Start: 2024-11-21 | End: 2024-11-24 | Stop reason: HOSPADM

## 2024-11-21 RX ORDER — POLYETHYLENE GLYCOL 3350 17 G/17G
17 POWDER, FOR SOLUTION ORAL DAILY
Status: DISCONTINUED | OUTPATIENT
Start: 2024-11-21 | End: 2024-11-24 | Stop reason: HOSPADM

## 2024-11-21 RX ORDER — SUCRALFATE 1 G/1
1 TABLET ORAL
Status: DISCONTINUED | OUTPATIENT
Start: 2024-11-21 | End: 2024-11-24 | Stop reason: HOSPADM

## 2024-11-21 RX ORDER — TALC
6 POWDER (GRAM) TOPICAL NIGHTLY PRN
Status: DISCONTINUED | OUTPATIENT
Start: 2024-11-21 | End: 2024-11-24 | Stop reason: HOSPADM

## 2024-11-21 RX ORDER — ACETAMINOPHEN 325 MG/1
650 TABLET ORAL EVERY 4 HOURS PRN
Status: DISCONTINUED | OUTPATIENT
Start: 2024-11-21 | End: 2024-11-24 | Stop reason: HOSPADM

## 2024-11-21 RX ORDER — FUROSEMIDE 10 MG/ML
40 INJECTION INTRAMUSCULAR; INTRAVENOUS EVERY 12 HOURS
Status: DISCONTINUED | OUTPATIENT
Start: 2024-11-21 | End: 2024-11-24

## 2024-11-21 RX ORDER — AMLODIPINE BESYLATE 5 MG/1
5 TABLET ORAL 2 TIMES DAILY
Status: DISCONTINUED | OUTPATIENT
Start: 2024-11-21 | End: 2024-11-22

## 2024-11-21 RX ORDER — ONDANSETRON HYDROCHLORIDE 2 MG/ML
4 INJECTION, SOLUTION INTRAVENOUS EVERY 8 HOURS PRN
Status: DISCONTINUED | OUTPATIENT
Start: 2024-11-21 | End: 2024-11-24 | Stop reason: HOSPADM

## 2024-11-21 RX ORDER — NICARDIPINE HYDROCHLORIDE 0.2 MG/ML
0-15 INJECTION INTRAVENOUS CONTINUOUS
Status: DISCONTINUED | OUTPATIENT
Start: 2024-11-21 | End: 2024-11-21

## 2024-11-21 RX ORDER — LANOLIN ALCOHOL/MO/W.PET/CERES
800 CREAM (GRAM) TOPICAL
Status: DISCONTINUED | OUTPATIENT
Start: 2024-11-21 | End: 2024-11-24 | Stop reason: HOSPADM

## 2024-11-21 RX ORDER — HYDRALAZINE HYDROCHLORIDE 25 MG/1
25 TABLET, FILM COATED ORAL EVERY 8 HOURS
Status: DISCONTINUED | OUTPATIENT
Start: 2024-11-21 | End: 2024-11-22

## 2024-11-21 RX ORDER — ATORVASTATIN CALCIUM 40 MG/1
40 TABLET, FILM COATED ORAL DAILY
Status: DISCONTINUED | OUTPATIENT
Start: 2024-11-21 | End: 2024-11-24 | Stop reason: HOSPADM

## 2024-11-21 RX ORDER — MORPHINE SULFATE 2 MG/ML
2 INJECTION, SOLUTION INTRAMUSCULAR; INTRAVENOUS EVERY 4 HOURS PRN
Status: DISCONTINUED | OUTPATIENT
Start: 2024-11-21 | End: 2024-11-24 | Stop reason: HOSPADM

## 2024-11-21 RX ADMIN — FUROSEMIDE 60 MG: 10 INJECTION, SOLUTION INTRAMUSCULAR; INTRAVENOUS at 11:11

## 2024-11-21 RX ADMIN — CARVEDILOL 25 MG: 25 TABLET, FILM COATED ORAL at 03:11

## 2024-11-21 RX ADMIN — AMLODIPINE BESYLATE 5 MG: 5 TABLET ORAL at 01:11

## 2024-11-21 RX ADMIN — HEPARIN SODIUM 5000 UNITS: 5000 INJECTION INTRAVENOUS; SUBCUTANEOUS at 01:11

## 2024-11-21 RX ADMIN — HEPARIN SODIUM 5000 UNITS: 5000 INJECTION INTRAVENOUS; SUBCUTANEOUS at 10:11

## 2024-11-21 RX ADMIN — ASPIRIN 81 MG CHEWABLE TABLET 81 MG: 81 TABLET CHEWABLE at 03:11

## 2024-11-21 RX ADMIN — ATORVASTATIN CALCIUM 40 MG: 40 TABLET, FILM COATED ORAL at 03:11

## 2024-11-21 RX ADMIN — SUCRALFATE 1 G: 1 TABLET ORAL at 10:11

## 2024-11-21 RX ADMIN — POTASSIUM CHLORIDE 40 MEQ: 1500 TABLET, EXTENDED RELEASE ORAL at 06:11

## 2024-11-21 RX ADMIN — SUCRALFATE 1 G: 1 TABLET ORAL at 03:11

## 2024-11-21 RX ADMIN — HYDRALAZINE HYDROCHLORIDE 25 MG: 25 TABLET ORAL at 10:11

## 2024-11-21 RX ADMIN — CARVEDILOL 25 MG: 25 TABLET, FILM COATED ORAL at 10:11

## 2024-11-21 RX ADMIN — POLYETHYLENE GLYCOL (3350) 17 G: 17 POWDER, FOR SOLUTION ORAL at 03:11

## 2024-11-21 RX ADMIN — HYDRALAZINE HYDROCHLORIDE 10 MG: 20 INJECTION INTRAMUSCULAR; INTRAVENOUS at 11:11

## 2024-11-21 RX ADMIN — FUROSEMIDE 40 MG: 10 INJECTION, SOLUTION INTRAMUSCULAR; INTRAVENOUS at 10:11

## 2024-11-21 RX ADMIN — NICARDIPINE HYDROCHLORIDE 2.5 MG/HR: 0.2 INJECTION, SOLUTION INTRAVENOUS at 01:11

## 2024-11-21 RX ADMIN — AMLODIPINE BESYLATE 5 MG: 5 TABLET ORAL at 10:11

## 2024-11-21 NOTE — PHARMACY MED REC
"    [facility]           Pharmacy  Admission Medication History     The home medication history was taken by Sheryl Martin.      Medication history obtained from Medications listed below were obtained from: Patient/family    Based on information gathered for medication list, you may go to "Admission" then "Reconcile Home Medications" tabs to review and/or act upon those items.     The home medication list has been updated by the Pharmacy department.   Please read ALL comments highlighted in yellow.   Please address this information as you see fit.    Feel free to contact us if you have any questions or require assistance.        No current facility-administered medications on file prior to encounter.     Current Outpatient Medications on File Prior to Encounter   Medication Sig Dispense Refill       Please address this information as you see fit.  Feel free to contact us if you have any questions or require assistance.    Sheryl Martin  [phone]              .          "

## 2024-11-21 NOTE — PLAN OF CARE
Vss, nadn, on cardene gtt for elevated bp. Aaox5, courtney's well, strong. On RA, o2 sats wnls. SR noted on mon. Voids, urinal at bs. Family aware of pts admit. No c/o pain. No c/o sob. See flow sheet for lisa info.

## 2024-11-21 NOTE — PT/OT/SLP PROGRESS
Occupational Therapy      Patient Name:  Abe Pak   MRN:  [id number]    Patient not seen today secondary to Therapist assessment (elevated BP; cardiology consulted). Will follow-up as able.    11/21/2024

## 2024-11-21 NOTE — ED PROVIDER NOTES
Encounter Date: 11/21/2024       History     Chief Complaint   Patient presents with    Shortness of Breath     C/o SOB on exertion w/ bilateral lower extremity edema x2 days. Pt hx of heart failure. Pt reports he stopped taking his daily meds several months ago bc he started feeling better. Denies CP. Denies other sx or complaints. /139. +4 edema bilateral lower extremities.      43-year-old male, PMH CHF (echo 10/22 30%), HTN, HLD, CKD, presents to ED with concern of 2 day onset shortness of breath with exertion and lower extremity edema.  Patient admits that he discontinued taking all of his home medications several months ago.  Denying chest pain, cough, nasal congestion or sore throat, headaches, visual changes, numbness or focal weakness, abdominal pain, nausea, vomiting, urinary or bowel complaints.  No other acute complaints at this time    The history is provided by the patient.     Review of patient's allergies indicates:  No Known Allergies  Past Medical History:   Diagnosis Date    CHF (congestive heart failure)     Hypertension      History reviewed. No pertinent surgical history.  No family history on file.  Social History     Tobacco Use    Smoking status: Former    Smokeless tobacco: Never   Substance Use Topics    Alcohol use: Yes     Comment: occasional    Drug use: No     Review of Systems   Constitutional:  Negative for chills and fever.   HENT:  Negative for congestion and sore throat.    Respiratory:  Positive for shortness of breath. Negative for cough.    Cardiovascular:  Positive for leg swelling. Negative for chest pain.   Gastrointestinal:  Negative for abdominal pain, diarrhea, nausea and vomiting.   Genitourinary:  Negative for dysuria and frequency.   Neurological:  Negative for dizziness, weakness, light-headedness, numbness and headaches.       Physical Exam     Initial Vitals [11/21/24 1005]   BP Pulse Resp Temp SpO2   (!) 221/139 (!) 115 (!) 24 98.6 °F (37 °C) 98 %      MAP        --         Physical Exam    Vitals reviewed.  Constitutional: He appears well-developed and well-nourished. He is cooperative. He does not have a sickly appearance. He does not appear ill. No distress.   Resting on exam, anxious appearing but otherwise in no distress   HENT:   Head: Normocephalic and atraumatic.   Eyes: EOM are normal.   Neck:   Normal range of motion.  Cardiovascular:  Regular rhythm.           Pulmonary/Chest:   Able to speak in full sentences with no labored breathing.  Maintaining appropriate O2 sats on room air.  Very mild crackles   Abdominal: There is no abdominal tenderness.   Musculoskeletal:      Cervical back: Normal range of motion.      Comments: Mild BLE edema     Neurological: He is alert. GCS eye subscore is 4. GCS verbal subscore is 5. GCS motor subscore is 6.   Psychiatric: He has a normal mood and affect. His speech is normal and behavior is normal.         ED Course   Procedures  Labs Reviewed   CBC W/ AUTO DIFFERENTIAL - Abnormal       Result Value    WBC 8.31      RBC 4.21 (*)     Hemoglobin 12.7 (*)     Hematocrit 39.0 (*)     MCV 93      MCH 30.2      MCHC 32.6      RDW 12.3      Platelets 271      MPV 9.3      Immature Granulocytes 0.2      Gran # (ANC) 6.2      Immature Grans (Abs) 0.02      Lymph # 1.5      Mono # 0.5      Eos # 0.1      Baso # 0.01      nRBC 0      Gran % 74.5 (*)     Lymph % 18.3      Mono % 5.9      Eosinophil % 1.0      Basophil % 0.1      Differential Method Automated     B-TYPE NATRIURETIC PEPTIDE - Abnormal     (*)    COMPREHENSIVE METABOLIC PANEL - Abnormal    Sodium 139      Potassium 3.7      Chloride 107      CO2 20 (*)     Glucose 99      BUN 31 (*)     Creatinine 2.3 (*)     Calcium 9.6      Total Protein 7.1      Albumin 3.9      Total Bilirubin 0.7      Alkaline Phosphatase 88      AST 23      ALT 27      eGFR 35 (*)     Anion Gap 12     TROPONIN I - Abnormal    Troponin I 0.063 (*)         ECG Results              EKG 12-lead  (Shortness of Breath) Age > 50 (In process)        Collection Time Result Time QRS Duration OHS QTC Calculation    11/21/24 10:04:52 11/21/24 11:02:05 86 450                     In process by Interface, Lab In [facility] (11/21/24 11:02:15)                   Narrative:    Test Reason : R06.02,    Vent. Rate : 115 BPM     Atrial Rate : 115 BPM     P-R Int : 154 ms          QRS Dur :  86 ms      QT Int : 326 ms       P-R-T Axes :  64  26  89 degrees    QTcB Int : 450 ms    Sinus tachycardia  Minimal voltage criteria for LVH, may be normal variant ( Sokolow-Kimball )  T wave abnormality, consider lateral ischemia  Abnormal ECG  When compared with ECG of 07-Oct-2022 15:42,  T wave inversion no longer evident in Anterior leads    Referred By: AAAREFERRAL SELF           Confirmed By:                                   Imaging Results              X-Ray Chest 1 View (Final result)  Result time 11/21/24 11:30:47      Final result by Richard Salinas MD (11/21/24 11:30:47)                   Impression:      Persistent cardiomegaly.      Electronically signed by: Richard Salinas MD  Date:    11/21/2024  Time:    11:30               Narrative:    EXAMINATION:  XR CHEST 1 VIEW    CLINICAL HISTORY:  Dyspnea, unspecified    TECHNIQUE:  Single views of the chest were performed.    COMPARISON:  Chest radiograph dated October 7, 2022    FINDINGS:  The trachea and cardiomediastinal silhouette remains stable.  There is no evidence of pleural effusions, pneumothoraces or consolidations.  Lungs are clear.  Osseous structures demonstrate no evidence for acute fractures or dislocations.                                       Medications   furosemide injection 60 mg (60 mg Intravenous Given 11/21/24 1138)   hydrALAZINE injection 10 mg (10 mg Intravenous Given 11/21/24 1136)     Medical Decision Making  Patient presents with concern of dyspnea with exertion and BLE edema.  History of CHF and admits to noncompliance with his home medications for several  months.  Afebrile.  Patient is anxious but in no apparent distress on exam.    DDx:  Including but not limited to CHF exacerbation, volume overload, GILMER, electrolyte abnormality    Amount and/or Complexity of Data Reviewed  Labs: ordered.  Radiology: ordered.               ED Course as of 24 1215   u 2024   1120 CBC auto differential(!)  No elevation WBC [KS]   1120 Comprehensive metabolic panel(!)  Elevation in creatinine 2.3, appearing grossly near baseline [KS]   1121 Troponin I(!)  Elevated 0.063 [KS]   1121 Brain natriuretic peptide(!)  Elevated 672 [KS]   1129 EK:  Sinus tachycardia, 115 beats per minute.  LVH, no ischemia [ST]   1205 X-Ray Chest 1 View  CXR noting cardiomegaly per Radiology review [KS]   1214 Patient given IV hydralazine and Lasix in ED. I do recommend patient to be placed in observation with Hospital Medicine and Cardiology consult.  Patient discussed with [facility] Medicine team, who has accepted patient for observation. [KS]   1215 Discussed with attending, Dr. Maravilla, who agrees with ED course and dispo. [KS]      ED Course User Index  [KS] Jg Bazan PA-C  [ST] Jaclyn Maravilla MD                             Clinical Impression:  Final diagnoses:  [R06.02] SOB (shortness of breath)  [R06.00] Dyspnea  [R60.0] Leg edema  [R79.89] Elevated troponin (Primary)          ED Disposition Condition    Observation Stable                Jg Bazan PA-C  24 1215

## 2024-11-21 NOTE — PT/OT/SLP PROGRESS
Physical Therapy      Patient Name:  Abe Pak   MRN:  [id number]    Patient not seen today secondary to Therapist assessment (elevated BP; Cardiology consulted.). Will follow-up as able.  11/21/2024

## 2024-11-21 NOTE — ASSESSMENT & PLAN NOTE
Echo from 2022:  Interpretation Summary  · The left ventricle is moderately enlarged with moderate concentric hypertrophy and severely decreased systolic function.  · The estimated ejection fraction is 30%.  · There is severe left ventricular global hypokinesis.  · Mild mitral regurgitation.  · Mild tricuspid regurgitation.  · Mild pulmonic regurgitation.  · Normal right ventricular size with normal right ventricular systolic function.  · There is no pulmonary hypertension.  · Small anterior pericardial effusion.    -patient of his medication for the past 3-4 months patient does not look like he is in acute decompensated heart failure.  -restarted him on Coreg, Lasix we will hold losartan for now due to his GILMER.  -cardiology consulted

## 2024-11-21 NOTE — ASSESSMENT & PLAN NOTE
Creatine stable for now. BMP reviewed- noted Estimated Creatinine Clearance: 50.1 mL/min (A) (based on SCr of 2.3 mg/dL (H)). according to latest data. Based on current GFR, CKD stage is stage 3 - GFR 30-59.  Monitor UOP and serial BMP and adjust therapy as needed. Renally dose meds. Avoid nephrotoxic medications and procedures.

## 2024-11-21 NOTE — PLAN OF CARE
"    Abe Pak is a 44 y/o male with h/o HTN, ACD, HTN, CKD stage IIIB, HFrEF (EF 30% in 2022) who reported to the ED after worsening dyspnea over the past 5 days. Patient states he stopped taking all his medications about a month ago as he had felt prior symptoms had completely resolved and he "felt fine". He states he did not know the severity or that he was supposed to keep taking his medications when he felt better. Patient reports initially having BLE, but has since resolved since being in ED. Has had episodes of nausea with 1 episode of non-bloody emesis and abd cramping. Now resolved. Patient reports no complaints at this current time.         Pt received 10mg IV hydral, 60 mg IV lasix. UOP >3.5L Pt placed on Nicardipine drip per primary team with reduction in MAPs. Bedside echo showing reduced EF with EPSS ~18mm.     Recommendations  -continue to diuresis  -Echo ordered, and cards f/up with medical mgmt including GDMT      Selene Bosch, DO  PGY1 [facility]     "

## 2024-11-21 NOTE — ASSESSMENT & PLAN NOTE
GILMER is likely due to  intrarenal congestion secondary to chronic systolic heart failure . Baseline creatinine is  2 . Most recent creatinine and eGFR are listed below.  Recent Labs     11/21/24  1054   CREATININE 2.3*   EGFRNORACEVR 35*      Plan  - GILMER is stable  - Avoid nephrotoxins and renally dose meds for GFR listed above  - Monitor urine output, serial BMP, and adjust therapy as needed  - keep map over 65

## 2024-11-21 NOTE — ED NOTES
Pt reports to ED with c/o shortness of breath with exertion for a couple of days which is becoming concerning to pt states happens about 3 years ago was diagnosed with HTN and fluid. Pt states that SOB happens when walking some distance and while working which has him moving around a lot. Pt states that he was on medication of heart failure and HTN stopped taking medication a few months ago due to feeling better. Pt also states he noticed swelling to lower extremities        Adult Physical Assessment  LOC: 43 y.o. male verified via two identifiers.  The patient is awake, alert & oriented to person, place & time. No acute distress noted at this time, pt is speaking appropriately at this time. Hx of CHF, HTN  APPEARANCE: Patient resting comfortably and appears to be in no acute distress at this time. Patient is clean and well groomed, patient's clothing is properly fastened.  SKIN:The skin is warm, dry & intact. Color consistent with ethnicity, patient has normal skin turgor and moist mucus membranes, skin intact, no breakdown or brusing noted.  MUSCULOSKELETAL: Patient moving all extremities well, no obvious swelling or deformities noted.  RESPIRATORY: Airway is open and patent, respirations are spontaneous, even, and non-labored patient has a normal effort and rate, no accessory muscle use noted.     CARDIAC: Patient has a normal rate and rhythm, no periphreal edema noted in any extremity, capillary refill < 3 seconds in all extremities. Denies chest pain or chest discomfort   ABDOMEN: Soft and non tender to palpation, no abdominal distention noted. Denies N/V/D and/or ABD pain  NEUROLOGIC: Sensation is intact. Eyes open spontaneously, behavior appropriate to situation, follows commands. Speech is clear and appropriate. Facial expression symmetrical, bilateral hand grasp equal and even. No bilateral lower extremity edema. Denies HA, dizziness and/or vision changes

## 2024-11-21 NOTE — H&P
[facility] Emergency [facility] Medicine  History & Physical    Patient Name: Abe Pak  MRN: [id number]  Patient Class: OP- Observation  Admission Date: 11/21/2024  Attending Physician: Victorina Garcia MD  Primary Care Provider: Monica Primary Doctor         Patient information was obtained from patient and ER records.     Subjective:     Principal Problem:Hypertensive emergency    Chief Complaint:   Chief Complaint   Patient presents with    Shortness of Breath     C/o SOB on exertion w/ bilateral lower extremity edema x2 days. Pt hx of heart failure. Pt reports he stopped taking his daily meds several months ago bc he started feeling better. Denies CP. Denies other sx or complaints. /139. +4 edema bilateral lower extremities.         HPI:   43 years old male past medical history of anemia of chronic disease, hypertension, CKD stage IIIB, systolic heart failure, presented to the ED for 5 days' duration of shortness of breath on exertion with leg edema, patient was diagnosed with CHF 4 years ago, last follow-up was on March 20, 2023, patient noncompliant with his home medications , echo from 2022 showed severely decreased systolic function.The estimated ejection fraction is 30%..On admission BNP was 672, troponin was elevated 0.06 , creatinine at 2.3 from baseline of 2, patient was found to have hypertensive emergency received 1 dose of IV hydralazine, in 1 dose of 60 mg IV Lasix, BP still elevated patient was started on nicardipine drip alongside with p.o. medication cardiology was consulted patient was admitted for further medical management.      Past Medical History:   Diagnosis Date    CHF (congestive heart failure)     Hypertension        History reviewed. No pertinent surgical history.    Review of patient's allergies indicates:  No Known Allergies    No current facility-administered medications on file prior to encounter.     Current Outpatient Medications on File Prior to Encounter   Medication Sig     [DISCONTINUED] amLODIPine (NORVASC) 10 MG tablet Take 1 tablet (10 mg total) by mouth once daily.    [DISCONTINUED] atorvastatin (LIPITOR) 40 MG tablet Take 1 tablet (40 mg total) by mouth once daily.    [DISCONTINUED] carvediloL (COREG) 6.25 MG tablet Take 1 tablet (6.25 mg total) by mouth 2 (two) times daily.    [DISCONTINUED] furosemide (LASIX) 40 MG tablet Take 1 tablet (40 mg total) by mouth once daily.    [DISCONTINUED] hydrALAZINE (APRESOLINE) 100 MG tablet Take 1 tablet (100 mg total) by mouth every 8 (eight) hours.    [DISCONTINUED] isosorbide dinitrate (ISORDIL) 20 MG tablet Take 1 tablet (20 mg total) by mouth 3 (three) times daily.    [DISCONTINUED] valsartan (DIOVAN) 40 MG tablet Take 2 tablets (80 mg total) by mouth once daily.     Family History    None       Tobacco Use    Smoking status: Former    Smokeless tobacco: Never   Substance and Sexual Activity    Alcohol use: Yes     Comment: occasional    Drug use: No    Sexual activity: Not on file     Review of Systems   Constitutional:  Positive for activity change and fatigue. Negative for diaphoresis and fever.   HENT: Negative.     Eyes: Negative.    Respiratory:  Positive for shortness of breath. Negative for wheezing.    Cardiovascular:  Positive for leg swelling. Negative for chest pain and palpitations.   Gastrointestinal: Negative.    Endocrine: Negative.    Genitourinary: Negative.    Musculoskeletal: Negative.    Skin: Negative.    Allergic/Immunologic: Negative.    Neurological: Negative.    Hematological: Negative.    Psychiatric/Behavioral: Negative.       Objective:     Vital Signs (Most Recent):  Temp: 98.6 °F (37 °C) (11/21/24 1005)  Pulse: 103 (11/21/24 1402)  Resp: 17 (11/21/24 1402)  BP: (!) 194/108 (11/21/24 1402)  SpO2: 95 % (11/21/24 1402) Vital Signs (24h Range):  Temp:  [98.6 °F (37 °C)] 98.6 °F (37 °C)  Pulse:  [101-115] 103  Resp:  [17-24] 17  SpO2:  [94 %-98 %] 95 %  BP: (193-221)/(108-139) 194/108     Weight: 115.7 kg  (255 lb)  Body mass index is 38.77 kg/m².     Physical Exam  Constitutional:       Appearance: He is obese.   HENT:      Head: Normocephalic and atraumatic.      Nose: Nose normal.      Mouth/Throat:      Mouth: Mucous membranes are moist.      Pharynx: Oropharynx is clear.   Eyes:      Extraocular Movements: Extraocular movements intact.      Pupils: Pupils are equal, round, and reactive to light.   Cardiovascular:      Rate and Rhythm: Tachycardia present.      Heart sounds: No murmur heard.     No friction rub. No gallop.   Pulmonary:      Effort: No respiratory distress.      Breath sounds: No stridor. No wheezing, rhonchi or rales.   Chest:      Chest wall: No tenderness.   Abdominal:      General: Abdomen is flat.      Palpations: Abdomen is soft.   Musculoskeletal:         General: Normal range of motion.      Cervical back: Normal range of motion.      Right lower leg: Edema present.      Left lower leg: Edema present.   Skin:     General: Skin is warm.      Capillary Refill: Capillary refill takes less than 2 seconds.   Neurological:      General: No focal deficit present.      Mental Status: He is alert and oriented to person, place, and time.   Psychiatric:         Mood and Affect: Mood normal.         Behavior: Behavior normal.              CRANIAL NERVES     CN III, IV, VI   Pupils are equal, round, and reactive to light.       Significant Labs: All pertinent labs within the past 24 hours have been reviewed.  Recent Lab Results         11/21/24  1054        Albumin 3.9       ALP 88       ALT 27       Anion Gap 12       AST 23       Baso # 0.01       Basophil % 0.1       BILIRUBIN TOTAL 0.7  Comment: For infants and newborns, interpretation of results should be based  on gestational age, weight and in agreement with clinical  observations.    Premature Infant recommended reference ranges:  Up to 24 hours.............<8.0 mg/dL  Up to 48 hours............<12.0 mg/dL  3-5 days..................<15.0  mg/dL  6-29 days.................<15.0 mg/dL           Comment: Values of less than 100 pg/ml are consistent with non-CHF populations.       BUN 31       Calcium 9.6       Chloride 107       CO2 20       Creatinine 2.3       Differential Method Automated       eGFR 35       Eos # 0.1       Eos % 1.0       Glucose 99       Gran # (ANC) 6.2       Gran % 74.5       Hematocrit 39.0       Hemoglobin 12.7       Immature Grans (Abs) 0.02  Comment: Mild elevation in immature granulocytes is non specific and   can be seen in a variety of conditions including stress response,   acute inflammation, trauma and pregnancy. Correlation with other   laboratory and clinical findings is essential.         Immature Granulocytes 0.2       Lymph # 1.5       Lymph % 18.3       MCH 30.2       MCHC 32.6       MCV 93       Mono # 0.5       Mono % 5.9       MPV 9.3       nRBC 0       Platelet Count 271       Potassium 3.7       PROTEIN TOTAL 7.1       RBC 4.21       RDW 12.3       Sodium 139       Troponin I 0.063  Comment: The reference interval for Troponin I represents the 99th percentile   cutoff   for our facility and is consistent with 3rd generation assay   performance.         WBC 8.31               Significant Imaging: I have reviewed all pertinent imaging results/findings within the past 24 hours.    Results for orders placed during the hospital encounter of 10/07/22    Echo Saline Bubble? No    Interpretation Summary  · The left ventricle is moderately enlarged with moderate concentric hypertrophy and severely decreased systolic function.  · The estimated ejection fraction is 30%.  · There is severe left ventricular global hypokinesis.  · Mild mitral regurgitation.  · Mild tricuspid regurgitation.  · Mild pulmonic regurgitation.  · Normal right ventricular size with normal right ventricular systolic function.  · There is no pulmonary hypertension.  · Small anterior pericardial effusion.  Assessment/Plan:     * Hypertensive  emergency  Patient has a current diagnosis of Hypertensive emergency with end organ damage evidenced by acute kidney injury and acute heart failure which is uncontrolled.  Latest blood pressure and vitals reviewed-   Temp:  [98.6 °F (37 °C)]   Pulse:  [101-115]   Resp:  [17-24]   BP: (193-221)/(108-139)   SpO2:  [94 %-98 %] .   Patient currently on IV antihypertensives.   Home meds for hypertension were reviewed and noted below.   Hypertension Medications      Nicardipine drip       Medication adjustment for hospital antihypertensives is as follows- patient received 1 dose of IV hydralazine was no response patient was placed on nicardipine drip admitted to ICU, antihypertensive oral medication were added.    Will aim for controlled BP reduction by medications noted above. Monitor and mitigate end organ damage as indicated.    Hypertension  Patients blood pressure range in the last 24 hours was: BP  Min: 193/134  Max: 221/139.The patient's inpatient anti-hypertensive regimen is listed below:  Current Antihypertensives  niCARdipine 40 mg/200 mL (0.2 mg/mL) infusion, Continuous, Intravenous  furosemide injection 40 mg, Every 12 hours, Intravenous  metoprolol injection 5 mg, Every 6 hours PRN, Intravenous  amLODIPine tablet 5 mg, 2 times daily, Oral  hydrALAZINE tablet 25 mg, Every 8 hours, Oral  carvediloL tablet 25 mg, 2 times daily, Oral    Plan  - BP is uncontrolled, will adjust as follows:  Adding nicardipine drip plus p.o. medications we will monitor close  -    Chronic systolic heart failure    Echo from 2022:  Interpretation Summary  · The left ventricle is moderately enlarged with moderate concentric hypertrophy and severely decreased systolic function.  · The estimated ejection fraction is 30%.  · There is severe left ventricular global hypokinesis.  · Mild mitral regurgitation.  · Mild tricuspid regurgitation.  · Mild pulmonic regurgitation.  · Normal right ventricular size with normal right ventricular  systolic function.  · There is no pulmonary hypertension.  · Small anterior pericardial effusion.    -patient of his medication for the past 3-4 months patient does not look like he is in acute decompensated heart failure.  -restarted him on Coreg, Lasix we will hold losartan for now due to his GILMER.  -cardiology consulted    Stage 3b chronic kidney disease (CKD)  Creatine stable for now. BMP reviewed- noted Estimated Creatinine Clearance: 50.1 mL/min (A) (based on SCr of 2.3 mg/dL (H)). according to latest data. Based on current GFR, CKD stage is stage 3 - GFR 30-59.  Monitor UOP and serial BMP and adjust therapy as needed. Renally dose meds. Avoid nephrotoxic medications and procedures.    Anemia of chronic disease  Anemia is likely due to chronic disease due to Chronic Kidney Disease. Most recent hemoglobin and hematocrit are listed below.  Recent Labs     11/21/24  1054   HGB 12.7*   HCT 39.0*     Plan  - Monitor serial CBC: Daily  - Transfuse PRBC if patient becomes hemodynamically unstable, symptomatic or H/H drops below 7/21.  - Patient has not received any PRBC transfusions to date  - Patient's anemia is currently stable  - we will monitor close    Acute renal failure superimposed on stage 4 chronic kidney disease  GILMER is likely due to  intrarenal congestion secondary to chronic systolic heart failure . Baseline creatinine is  2 . Most recent creatinine and eGFR are listed below.  Recent Labs     11/21/24  1054   CREATININE 2.3*   EGFRNORACEVR 35*      Plan  - GILMER is stable  - Avoid nephrotoxins and renally dose meds for GFR listed above  - Monitor urine output, serial BMP, and adjust therapy as needed  - keep map over 65      VTE Risk Mitigation (From admission, onward)           Ordered     heparin (porcine) injection 5,000 Units  Every 8 hours         11/21/24 1306     IP VTE HIGH RISK PATIENT  Once         11/21/24 1306     Place sequential compression device  Until discontinued         11/21/24 1306                        On 11/21/2024, patient should be placed in hospital observation services under my care.             Victorina Garcia MD  Department of Hospital Medicine  [place] - Emergency Dept

## 2024-11-21 NOTE — Clinical Note
Diagnosis: Elevated troponin [137289]   Future Attending Provider: JENNIFFER ROSALES [[id number]]   Is the patient being sent to ED Observation?: No

## 2024-11-21 NOTE — SUBJECTIVE & OBJECTIVE
Past Medical History:   Diagnosis Date    CHF (congestive heart failure)     Hypertension        History reviewed. No pertinent surgical history.    Review of patient's allergies indicates:  No Known Allergies    No current facility-administered medications on file prior to encounter.     Current Outpatient Medications on File Prior to Encounter   Medication Sig    [DISCONTINUED] amLODIPine (NORVASC) 10 MG tablet Take 1 tablet (10 mg total) by mouth once daily.    [DISCONTINUED] atorvastatin (LIPITOR) 40 MG tablet Take 1 tablet (40 mg total) by mouth once daily.    [DISCONTINUED] carvediloL (COREG) 6.25 MG tablet Take 1 tablet (6.25 mg total) by mouth 2 (two) times daily.    [DISCONTINUED] furosemide (LASIX) 40 MG tablet Take 1 tablet (40 mg total) by mouth once daily.    [DISCONTINUED] hydrALAZINE (APRESOLINE) 100 MG tablet Take 1 tablet (100 mg total) by mouth every 8 (eight) hours.    [DISCONTINUED] isosorbide dinitrate (ISORDIL) 20 MG tablet Take 1 tablet (20 mg total) by mouth 3 (three) times daily.    [DISCONTINUED] valsartan (DIOVAN) 40 MG tablet Take 2 tablets (80 mg total) by mouth once daily.     Family History    None       Tobacco Use    Smoking status: Former    Smokeless tobacco: Never   Substance and Sexual Activity    Alcohol use: Yes     Comment: occasional    Drug use: No    Sexual activity: Not on file     Review of Systems   Constitutional:  Positive for activity change and fatigue. Negative for diaphoresis and fever.   HENT: Negative.     Eyes: Negative.    Respiratory:  Positive for shortness of breath. Negative for wheezing.    Cardiovascular:  Positive for leg swelling. Negative for chest pain and palpitations.   Gastrointestinal: Negative.    Endocrine: Negative.    Genitourinary: Negative.    Musculoskeletal: Negative.    Skin: Negative.    Allergic/Immunologic: Negative.    Neurological: Negative.    Hematological: Negative.    Psychiatric/Behavioral: Negative.       Objective:     Vital  Signs (Most Recent):  Temp: 98.6 °F (37 °C) (11/21/24 1005)  Pulse: 103 (11/21/24 1402)  Resp: 17 (11/21/24 1402)  BP: (!) 194/108 (11/21/24 1402)  SpO2: 95 % (11/21/24 1402) Vital Signs (24h Range):  Temp:  [98.6 °F (37 °C)] 98.6 °F (37 °C)  Pulse:  [101-115] 103  Resp:  [17-24] 17  SpO2:  [94 %-98 %] 95 %  BP: (193-221)/(108-139) 194/108     Weight: 115.7 kg (255 lb)  Body mass index is 38.77 kg/m².     Physical Exam  Constitutional:       Appearance: He is obese.   HENT:      Head: Normocephalic and atraumatic.      Nose: Nose normal.      Mouth/Throat:      Mouth: Mucous membranes are moist.      Pharynx: Oropharynx is clear.   Eyes:      Extraocular Movements: Extraocular movements intact.      Pupils: Pupils are equal, round, and reactive to light.   Cardiovascular:      Rate and Rhythm: Tachycardia present.      Heart sounds: No murmur heard.     No friction rub. No gallop.   Pulmonary:      Effort: No respiratory distress.      Breath sounds: No stridor. No wheezing, rhonchi or rales.   Chest:      Chest wall: No tenderness.   Abdominal:      General: Abdomen is flat.      Palpations: Abdomen is soft.   Musculoskeletal:         General: Normal range of motion.      Cervical back: Normal range of motion.      Right lower leg: Edema present.      Left lower leg: Edema present.   Skin:     General: Skin is warm.      Capillary Refill: Capillary refill takes less than 2 seconds.   Neurological:      General: No focal deficit present.      Mental Status: He is alert and oriented to person, place, and time.   Psychiatric:         Mood and Affect: Mood normal.         Behavior: Behavior normal.              CRANIAL NERVES     CN III, IV, VI   Pupils are equal, round, and reactive to light.       Significant Labs: All pertinent labs within the past 24 hours have been reviewed.  Recent Lab Results         11/21/24  1054        Albumin 3.9       ALP 88       ALT 27       Anion Gap 12       AST 23       Baso # 0.01        Basophil % 0.1       BILIRUBIN TOTAL 0.7  Comment: For infants and newborns, interpretation of results should be based  on gestational age, weight and in agreement with clinical  observations.    Premature Infant recommended reference ranges:  Up to 24 hours.............<8.0 mg/dL  Up to 48 hours............<12.0 mg/dL  3-5 days..................<15.0 mg/dL  6-29 days.................<15.0 mg/dL           Comment: Values of less than 100 pg/ml are consistent with non-CHF populations.       BUN 31       Calcium 9.6       Chloride 107       CO2 20       Creatinine 2.3       Differential Method Automated       eGFR 35       Eos # 0.1       Eos % 1.0       Glucose 99       Gran # (ANC) 6.2       Gran % 74.5       Hematocrit 39.0       Hemoglobin 12.7       Immature Grans (Abs) 0.02  Comment: Mild elevation in immature granulocytes is non specific and   can be seen in a variety of conditions including stress response,   acute inflammation, trauma and pregnancy. Correlation with other   laboratory and clinical findings is essential.         Immature Granulocytes 0.2       Lymph # 1.5       Lymph % 18.3       MCH 30.2       MCHC 32.6       MCV 93       Mono # 0.5       Mono % 5.9       MPV 9.3       nRBC 0       Platelet Count 271       Potassium 3.7       PROTEIN TOTAL 7.1       RBC 4.21       RDW 12.3       Sodium 139       Troponin I 0.063  Comment: The reference interval for Troponin I represents the 99th percentile   cutoff   for our facility and is consistent with 3rd generation assay   performance.         WBC 8.31               Significant Imaging: I have reviewed all pertinent imaging results/findings within the past 24 hours.    Results for orders placed during the hospital encounter of 10/07/22    Echo Saline Bubble? No    Interpretation Summary  · The left ventricle is moderately enlarged with moderate concentric hypertrophy and severely decreased systolic function.  · The estimated ejection fraction  is 30%.  · There is severe left ventricular global hypokinesis.  · Mild mitral regurgitation.  · Mild tricuspid regurgitation.  · Mild pulmonic regurgitation.  · Normal right ventricular size with normal right ventricular systolic function.  · There is no pulmonary hypertension.  · Small anterior pericardial effusion.

## 2024-11-21 NOTE — ASSESSMENT & PLAN NOTE
Anemia is likely due to chronic disease due to Chronic Kidney Disease. Most recent hemoglobin and hematocrit are listed below.  Recent Labs     11/21/24  1054   HGB 12.7*   HCT 39.0*     Plan  - Monitor serial CBC: Daily  - Transfuse PRBC if patient becomes hemodynamically unstable, symptomatic or H/H drops below 7/21.  - Patient has not received any PRBC transfusions to date  - Patient's anemia is currently stable  - we will monitor close

## 2024-11-21 NOTE — ASSESSMENT & PLAN NOTE
Patient has a current diagnosis of Hypertensive emergency with end organ damage evidenced by acute kidney injury and acute heart failure which is uncontrolled.  Latest blood pressure and vitals reviewed-   Temp:  [98.6 °F (37 °C)]   Pulse:  [101-115]   Resp:  [17-24]   BP: (193-221)/(108-139)   SpO2:  [94 %-98 %] .   Patient currently on IV antihypertensives.   Home meds for hypertension were reviewed and noted below.   Hypertension Medications      Nicardipine drip       Medication adjustment for hospital antihypertensives is as follows- patient received 1 dose of IV hydralazine was no response patient was placed on nicardipine drip admitted to ICU, antihypertensive oral medication were added.    Will aim for controlled BP reduction by medications noted above. Monitor and mitigate end organ damage as indicated.

## 2024-11-21 NOTE — HPI
43 years old male past medical history of anemia of chronic disease, hypertension, CKD stage IIIB, systolic heart failure, presented to the ED for 5 days' duration of shortness of breath on exertion with leg edema, patient was diagnosed with CHF 4 years ago, last follow-up was on March 20, 2023, patient noncompliant with his home medications , echo from 2022 showed severely decreased systolic function.The estimated ejection fraction is 30%..On admission BNP was 672, troponin was elevated 0.06 , creatinine at 2.3 from baseline of 2, patient was found to have hypertensive emergency received 1 dose of IV hydralazine, in 1 dose of 60 mg IV Lasix, BP still elevated patient was started on nicardipine drip alongside with p.o. medication cardiology was consulted patient was admitted for further medical management.

## 2024-11-21 NOTE — ASSESSMENT & PLAN NOTE
Patients blood pressure range in the last 24 hours was: BP  Min: 193/134  Max: 221/139.The patient's inpatient anti-hypertensive regimen is listed below:  Current Antihypertensives  niCARdipine 40 mg/200 mL (0.2 mg/mL) infusion, Continuous, Intravenous  furosemide injection 40 mg, Every 12 hours, Intravenous  metoprolol injection 5 mg, Every 6 hours PRN, Intravenous  amLODIPine tablet 5 mg, 2 times daily, Oral  hydrALAZINE tablet 25 mg, Every 8 hours, Oral  carvediloL tablet 25 mg, 2 times daily, Oral    Plan  - BP is uncontrolled, will adjust as follows:  Adding nicardipine drip plus p.o. medications we will monitor close  -

## 2024-11-22 PROBLEM — R60.0 LEG EDEMA: Status: ACTIVE | Noted: 2024-11-22

## 2024-11-22 PROBLEM — I50.21 ACUTE HEART FAILURE WITH REDUCED EJECTION FRACTION (HFREF, <= 40%): Status: ACTIVE | Noted: 2024-11-22

## 2024-11-22 LAB
ANION GAP SERPL CALC-SCNC: 13 MMOL/L (ref 8–16)
ANION GAP SERPL CALC-SCNC: 13 MMOL/L (ref 8–16)
APICAL FOUR CHAMBER EJECTION FRACTION: 33 %
APICAL TWO CHAMBER EJECTION FRACTION: 32 %
ASCENDING AORTA: 3.8 CM
AV INDEX (PROSTH): 0.66
AV MEAN GRADIENT: 3.4 MMHG
AV PEAK GRADIENT: 5.8 MMHG
AV VALVE AREA BY VELOCITY RATIO: 2.9 CM²
AV VALVE AREA: 3.3 CM²
AV VELOCITY RATIO: 0.58
BSA FOR ECHO PROCEDURE: 2.36 M2
BUN SERPL-MCNC: 32 MG/DL (ref 6–20)
BUN SERPL-MCNC: 32 MG/DL (ref 6–20)
CALCIUM SERPL-MCNC: 9.1 MG/DL (ref 8.7–10.5)
CALCIUM SERPL-MCNC: 9.1 MG/DL (ref 8.7–10.5)
CHLORIDE SERPL-SCNC: 106 MMOL/L (ref 95–110)
CHLORIDE SERPL-SCNC: 106 MMOL/L (ref 95–110)
CO2 SERPL-SCNC: 20 MMOL/L (ref 23–29)
CO2 SERPL-SCNC: 20 MMOL/L (ref 23–29)
CREAT SERPL-MCNC: 2.4 MG/DL (ref 0.5–1.4)
CREAT SERPL-MCNC: 2.4 MG/DL (ref 0.5–1.4)
CV ECHO LV RWT: 0.53 CM
DOP CALC AO PEAK VEL: 1.2 M/S
DOP CALC AO VTI: 17.8 CM
DOP CALC LVOT AREA: 4.9 CM2
DOP CALC LVOT DIAMETER: 2.5 CM
DOP CALC LVOT PEAK VEL: 0.7 M/S
DOP CALC LVOT STROKE VOLUME: 57.9 CM3
DOP CALC MV VTI: 25.7 CM
DOP CALCLVOT PEAK VEL VTI: 11.8 CM
E WAVE DECELERATION TIME: 168.07 MSEC
E/A RATIO: 1.14
E/E' RATIO: 20.6 M/S
ECHO LV POSTERIOR WALL: 1.6 CM (ref 0.6–1.1)
EST. GFR  (NO RACE VARIABLE): 33 ML/MIN/1.73 M^2
EST. GFR  (NO RACE VARIABLE): 33 ML/MIN/1.73 M^2
FRACTIONAL SHORTENING: 15 % (ref 28–44)
GLUCOSE SERPL-MCNC: 97 MG/DL (ref 70–110)
GLUCOSE SERPL-MCNC: 97 MG/DL (ref 70–110)
HR MV ECHO: 78 BPM
INTERVENTRICULAR SEPTUM: 1.4 CM (ref 0.6–1.1)
IVC DIAMETER: 1.62 CM
LEFT ATRIUM AREA SYSTOLIC (APICAL 2 CHAMBER): 28.62 CM2
LEFT ATRIUM AREA SYSTOLIC (APICAL 4 CHAMBER): 28.19 CM2
LEFT ATRIUM VOLUME INDEX MOD: 42.4 ML/M2
LEFT ATRIUM VOLUME MOD: 96.17 ML
LEFT INTERNAL DIMENSION IN SYSTOLE: 5.1 CM (ref 2.1–4)
LEFT VENTRICLE DIASTOLIC VOLUME INDEX: 80.26 ML/M2
LEFT VENTRICLE DIASTOLIC VOLUME: 182.19 ML
LEFT VENTRICLE END DIASTOLIC VOLUME APICAL 2 CHAMBER: 205.77 ML
LEFT VENTRICLE END DIASTOLIC VOLUME APICAL 4 CHAMBER: 247.63 ML
LEFT VENTRICLE END SYSTOLIC VOLUME APICAL 2 CHAMBER: 98.71 ML
LEFT VENTRICLE END SYSTOLIC VOLUME APICAL 4 CHAMBER: 93.04 ML
LEFT VENTRICLE MASS INDEX: 188.3 G/M2
LEFT VENTRICLE SYSTOLIC VOLUME INDEX: 53.7 ML/M2
LEFT VENTRICLE SYSTOLIC VOLUME: 121.92 ML
LEFT VENTRICULAR INTERNAL DIMENSION IN DIASTOLE: 6 CM (ref 3.5–6)
LEFT VENTRICULAR MASS: 427.4 G
LV LATERAL E/E' RATIO: 17.17 M/S
LV SEPTAL E/E' RATIO: 25.75 M/S
LVED V (TEICH): 182.19 ML
LVES V (TEICH): 121.92 ML
LVOT MG: 1.01 MMHG
LVOT MV: 0.48 CM/S
MAGNESIUM SERPL-MCNC: 2 MG/DL (ref 1.6–2.6)
MR PISA EROA: 0.17 CM2
MV A" WAVE DURATION": 110.37 MSEC
MV MEAN GRADIENT: 3 MMHG
MV PEAK A VEL: 0.9 M/S
MV PEAK E VEL: 1.03 M/S
MV PEAK GRADIENT: 6 MMHG
MV VALVE AREA BY CONTINUITY EQUATION: 2.25 CM2
OHS CV RV/LV RATIO: 0.7 CM
OHS LV EJECTION FRACTION SIMPSONS BIPLANE MOD: 32 %
PHOSPHATE SERPL-MCNC: 4.4 MG/DL (ref 2.7–4.5)
PISA MRMAX VEL: 4.59 M/S
PISA RADIUS: 0.64 CM
PISA VN NYQUIST MS: 0.31 M/S
PISA VN NYQUIST: 0.31 M/S
POTASSIUM SERPL-SCNC: 3.8 MMOL/L (ref 3.5–5.1)
POTASSIUM SERPL-SCNC: 3.8 MMOL/L (ref 3.5–5.1)
PULM VEIN S/D RATIO: 0.89
PV MV: 0.54 M/S
PV PEAK D VEL: 0.47 M/S
PV PEAK GRADIENT: 1 MMHG
PV PEAK S VEL: 0.42 M/S
PV PEAK VELOCITY: 0.58 M/S
RA PRESSURE ESTIMATED: 3 MMHG
RA VOL SYS: 41.42 ML
RIGHT ATRIAL AREA: 15.6 CM2
RIGHT ATRIUM VOLUME AREA LENGTH APICAL 4 CHAMBER: 39.33 ML
RIGHT VENTRICLE DIASTOLIC BASEL DIMENSION: 4.2 CM
RV TISSUE DOPPLER FREE WALL SYSTOLIC VELOCITY 1 (APICAL 4 CHAMBER VIEW): 11.49 CM/S
SINUS: 4.93 CM
SODIUM SERPL-SCNC: 139 MMOL/L (ref 136–145)
SODIUM SERPL-SCNC: 139 MMOL/L (ref 136–145)
STJ: 4.25 CM
TDI LATERAL: 0.06 M/S
TDI SEPTAL: 0.04 M/S
TDI: 0.05 M/S
TRICUSPID ANNULAR PLANE SYSTOLIC EXCURSION: 2.23 CM
Z-SCORE OF LEFT VENTRICULAR DIMENSION IN END DIASTOLE: -3.41
Z-SCORE OF LEFT VENTRICULAR DIMENSION IN END SYSTOLE: 0

## 2024-11-22 PROCEDURE — 97161 PT EVAL LOW COMPLEX 20 MIN: CPT

## 2024-11-22 PROCEDURE — 83735 ASSAY OF MAGNESIUM: CPT

## 2024-11-22 PROCEDURE — 63600175 PHARM REV CODE 636 W HCPCS

## 2024-11-22 PROCEDURE — 25000003 PHARM REV CODE 250

## 2024-11-22 PROCEDURE — 84100 ASSAY OF PHOSPHORUS: CPT

## 2024-11-22 PROCEDURE — 25500020 PHARM REV CODE 255: Performed by: INTERNAL MEDICINE

## 2024-11-22 PROCEDURE — 11000001 HC ACUTE MED/SURG PRIVATE ROOM

## 2024-11-22 PROCEDURE — 80048 BASIC METABOLIC PNL TOTAL CA: CPT

## 2024-11-22 PROCEDURE — 94761 N-INVAS EAR/PLS OXIMETRY MLT: CPT

## 2024-11-22 PROCEDURE — 97165 OT EVAL LOW COMPLEX 30 MIN: CPT

## 2024-11-22 PROCEDURE — 36415 COLL VENOUS BLD VENIPUNCTURE: CPT

## 2024-11-22 RX ORDER — AMLODIPINE BESYLATE 5 MG/1
5 TABLET ORAL ONCE
Status: COMPLETED | OUTPATIENT
Start: 2024-11-22 | End: 2024-11-22

## 2024-11-22 RX ORDER — HYDRALAZINE HYDROCHLORIDE 25 MG/1
25 TABLET, FILM COATED ORAL ONCE
Status: COMPLETED | OUTPATIENT
Start: 2024-11-22 | End: 2024-11-22

## 2024-11-22 RX ORDER — MUPIROCIN 20 MG/G
OINTMENT TOPICAL 2 TIMES DAILY
Status: DISCONTINUED | OUTPATIENT
Start: 2024-11-22 | End: 2024-11-24 | Stop reason: HOSPADM

## 2024-11-22 RX ORDER — HYDRALAZINE HYDROCHLORIDE 25 MG/1
50 TABLET, FILM COATED ORAL EVERY 8 HOURS
Status: DISCONTINUED | OUTPATIENT
Start: 2024-11-22 | End: 2024-11-24

## 2024-11-22 RX ORDER — AMLODIPINE BESYLATE 5 MG/1
10 TABLET ORAL 2 TIMES DAILY
Status: DISCONTINUED | OUTPATIENT
Start: 2024-11-22 | End: 2024-11-22

## 2024-11-22 RX ORDER — VALSARTAN 40 MG/1
80 TABLET ORAL DAILY
Status: DISCONTINUED | OUTPATIENT
Start: 2024-11-22 | End: 2024-11-23

## 2024-11-22 RX ORDER — NIFEDIPINE 30 MG/1
60 TABLET, EXTENDED RELEASE ORAL DAILY
Status: DISCONTINUED | OUTPATIENT
Start: 2024-11-22 | End: 2024-11-24 | Stop reason: HOSPADM

## 2024-11-22 RX ADMIN — HYDRALAZINE HYDROCHLORIDE 25 MG: 25 TABLET ORAL at 06:11

## 2024-11-22 RX ADMIN — FUROSEMIDE 40 MG: 10 INJECTION, SOLUTION INTRAMUSCULAR; INTRAVENOUS at 08:11

## 2024-11-22 RX ADMIN — MUPIROCIN: 20 OINTMENT TOPICAL at 08:11

## 2024-11-22 RX ADMIN — NIFEDIPINE 60 MG: 30 TABLET, FILM COATED, EXTENDED RELEASE ORAL at 04:11

## 2024-11-22 RX ADMIN — AMLODIPINE BESYLATE 5 MG: 5 TABLET ORAL at 08:11

## 2024-11-22 RX ADMIN — CARVEDILOL 25 MG: 25 TABLET, FILM COATED ORAL at 08:11

## 2024-11-22 RX ADMIN — SUCRALFATE 1 G: 1 TABLET ORAL at 04:11

## 2024-11-22 RX ADMIN — HEPARIN SODIUM 5000 UNITS: 5000 INJECTION INTRAVENOUS; SUBCUTANEOUS at 01:11

## 2024-11-22 RX ADMIN — PERFLUTREN 1 ML: 6.52 INJECTION, SUSPENSION INTRAVENOUS at 05:11

## 2024-11-22 RX ADMIN — SUCRALFATE 1 G: 1 TABLET ORAL at 08:11

## 2024-11-22 RX ADMIN — HEPARIN SODIUM 5000 UNITS: 5000 INJECTION INTRAVENOUS; SUBCUTANEOUS at 09:11

## 2024-11-22 RX ADMIN — SUCRALFATE 1 G: 1 TABLET ORAL at 06:11

## 2024-11-22 RX ADMIN — HYDRALAZINE HYDROCHLORIDE 50 MG: 25 TABLET ORAL at 09:11

## 2024-11-22 RX ADMIN — ATORVASTATIN CALCIUM 40 MG: 40 TABLET, FILM COATED ORAL at 08:11

## 2024-11-22 RX ADMIN — ASPIRIN 81 MG CHEWABLE TABLET 81 MG: 81 TABLET CHEWABLE at 08:11

## 2024-11-22 RX ADMIN — HYDRALAZINE HYDROCHLORIDE 25 MG: 25 TABLET ORAL at 08:11

## 2024-11-22 RX ADMIN — SUCRALFATE 1 G: 1 TABLET ORAL at 11:11

## 2024-11-22 RX ADMIN — VALSARTAN 80 MG: 40 TABLET, FILM COATED ORAL at 09:11

## 2024-11-22 RX ADMIN — MUPIROCIN: 20 OINTMENT TOPICAL at 11:11

## 2024-11-22 RX ADMIN — HEPARIN SODIUM 5000 UNITS: 5000 INJECTION INTRAVENOUS; SUBCUTANEOUS at 06:11

## 2024-11-22 RX ADMIN — POLYETHYLENE GLYCOL (3350) 17 G: 17 POWDER, FOR SOLUTION ORAL at 08:11

## 2024-11-22 RX ADMIN — METOROPROLOL TARTRATE 5 MG: 5 INJECTION, SOLUTION INTRAVENOUS at 06:11

## 2024-11-22 NOTE — ASSESSMENT & PLAN NOTE
Continued Stay /CM Assessment/Plan of Care Note       Active Substitute Decision Maker (SDM)    There are no active Substitute Decision Maker (SDM) on file.         Progress note:  PERI provided pt with [employer] information with contact information highlighted to request an application. Hours are 8:30am-5:00pm M-F. PERI encouraged pt to call to request application tomorrow. RN made aware.    See SW/CM flowsheets for other objective data.    Disposition Recommendations:  Preliminary discharge destination:    SW/CM recommendation for discharge: Other (comment) (TBD)    Discharge Plan/Needs:     Continued Care and Services - Admitted Since 11/26/2023       Home Medical Care       Service Provider Request Status Selected Services Address Phone Fax    [facility]  Selected Home Health Services [address] [place] [id number] [phone]              Dialysis Infusion       Service Provider Request Status Selected Services Address Phone Fax    [facility] [place] [facility]  [facility] [address] [facility] [address] [phone]                      Devices/ Equipment that need to be arranged for discharge:     Accepted   Pending insurance authorization   Others:    Anticipated date of DME availability:     Prior To Hospitalization:    Living Situation: Parent and residing at House    .  Support Systems: Family members   Home Devices/Equipment: None            Mobility Assist Devices: Wheelchair   Type of Service Prior to Hospitalization: Dialysis         Family     Patient/Family discharge goal (s):        Resources provided:           Therapy Recommendations for Discharge:   PT:      Last Filed Values         Value Time User    PT Discharge Needs  therapy 1-3 times per week 12/7/2023 12:07 PM Akua Guerrero PTA          OT:       Last Filed Values         Value Time User    OT Discharge Needs  does not require  Patients blood pressure range in the last 24 hours was: BP  Min: 115/84  Max: 193/81.The patient's inpatient anti-hypertensive regimen is listed below:  Current Antihypertensives  furosemide injection 40 mg, Every 12 hours, Intravenous  metoprolol injection 5 mg, Every 6 hours PRN, Intravenous  carvediloL tablet 25 mg, 2 times daily, Oral  hydrALAZINE tablet 50 mg, Every 8 hours, Oral  valsartan tablet 80 mg, Daily, Oral  NIFEdipine 24 hr tablet 60 mg, Daily, Oral    Plan  - BP is controlled, will adjust as follows:  Adding nicardipine drip plus p.o. medications we will monitor close  -   ongoing therapy 11/29/2023  3:43 PM Terrie Wiggins, OTR/L          SLP:    Last Filed Values       None            Mobility Equipment Recommended for Discharge: Pt owns w/c, RW      Barriers to Discharge  Identified Barriers to Discharge/Transition Planning:

## 2024-11-22 NOTE — PROGRESS NOTES
[facility] Medicine  Progress Note    Patient Name: Abe Pak  MRN: [id number]  Patient Class: IP- Inpatient   Admission Date: 11/21/2024  Length of Stay: 1 days  Attending Physician: Victorina Craft MD  Primary Care Provider: Monica, Primary Doctor        Subjective:     Principal Problem:Hypertensive emergency        HPI:  43 years old male past medical history of anemia of chronic disease, hypertension, CKD stage IIIB, systolic heart failure, presented to the ED for 5 days' duration of shortness of breath on exertion with leg edema, patient was diagnosed with CHF 4 years ago, last follow-up was on March 20, 2023, patient noncompliant with his home medications , echo from 2022 showed severely decreased systolic function.The estimated ejection fraction is 30%..On admission BNP was 672, troponin was elevated 0.06 , creatinine at 2.3 from baseline of 2, patient was found to have hypertensive emergency received 1 dose of IV hydralazine, in 1 dose of 60 mg IV Lasix, BP still elevated patient was started on nicardipine drip alongside with p.o. medication cardiology was consulted patient was admitted for further medical management.      Overview/Hospital Course:  No notes on file    Interval History:  Patient was seen in the morning alert and oriented x4 breathing room air no distress blood pressure is controlled nicardipine drip was stopped, home blood pressure medications were adjusted.    Review of Systems   Constitutional:  Negative for activity change, diaphoresis, fatigue and fever.   HENT: Negative.     Eyes: Negative.    Respiratory:  Negative for shortness of breath and wheezing.    Cardiovascular:  Negative for chest pain, palpitations and leg swelling.   Gastrointestinal: Negative.    Endocrine: Negative.    Genitourinary: Negative.    Musculoskeletal: Negative.    Skin: Negative.    Allergic/Immunologic: Negative.    Neurological: Negative.    Hematological: Negative.     Psychiatric/Behavioral: Negative.       Objective:     Vital Signs (Most Recent):  Temp: 98.2 °F (36.8 °C) (11/22/24 1505)  Pulse: 76 (11/22/24 1608)  Resp: (!) 24 (11/22/24 1608)  BP: (!) 159/100 (11/22/24 1600)  SpO2: 97 % (11/22/24 1608) Vital Signs (24h Range):  Temp:  [97.4 °F (36.3 °C)-98.2 °F (36.8 °C)] 98.2 °F (36.8 °C)  Pulse:  [] 76  Resp:  [12-43] 24  SpO2:  [89 %-100 %] 97 %  BP: (115-193)/() 159/100     Weight: 116.5 kg (256 lb 13.4 oz)  Body mass index is 39.05 kg/m².    Intake/Output Summary (Last 24 hours) at 11/22/2024 1609  Last data filed at 11/22/2024 1608  Gross per 24 hour   Intake 1780.87 ml   Output 2226 ml   Net -445.13 ml         Physical Exam  Constitutional:       Appearance: He is obese.   HENT:      Head: Normocephalic and atraumatic.      Nose: Nose normal.      Mouth/Throat:      Mouth: Mucous membranes are moist.      Pharynx: Oropharynx is clear.   Eyes:      Extraocular Movements: Extraocular movements intact.      Pupils: Pupils are equal, round, and reactive to light.   Cardiovascular:      Rate and Rhythm: Normal rate and regular rhythm.      Heart sounds: No murmur heard.     No friction rub. No gallop.   Pulmonary:      Effort: No respiratory distress.      Breath sounds: No stridor. No wheezing, rhonchi or rales.   Chest:      Chest wall: No tenderness.   Abdominal:      General: Abdomen is flat.      Palpations: Abdomen is soft.   Musculoskeletal:         General: Normal range of motion.      Cervical back: Normal range of motion.      Right lower leg: No edema.      Left lower leg: No edema.   Skin:     General: Skin is warm.      Capillary Refill: Capillary refill takes less than 2 seconds.   Neurological:      General: No focal deficit present.      Mental Status: He is alert and oriented to person, place, and time.   Psychiatric:         Mood and Affect: Mood normal.         Behavior: Behavior normal.             Significant Labs: All pertinent labs within  the past 24 hours have been reviewed.  Recent Lab Results         11/22/24  0713   11/21/24  1631        Anion Gap 13          13         Baso #   0.02       Basophil %   0.2       BUN 32          32         Calcium 9.1          9.1         Chloride 106          106         CO2 20          20         Creatinine 2.4          2.4         Differential Method   Automated       eGFR 33          33         Eos #   0.0       Eos %   0.2       Estimated Avg Glucose   88       Free T4   1.07       Glucose 97          97         Gran # (ANC)   10.3       Gran %   88.6       Hematocrit   40.5       Hemoglobin   13.1       Hemoglobin A1C External   4.7  Comment: ADA Screening Guidelines:  5.7-6.4%  Consistent with prediabetes  >or=6.5%  Consistent with diabetes    High levels of fetal hemoglobin interfere with the HbA1C  assay. Heterozygous hemoglobin variants (HbS, HgC, etc)do  not significantly interfere with this assay.   However, presence of multiple variants may affect accuracy.         Immature Grans (Abs)   0.04  Comment: Mild elevation in immature granulocytes is non specific and   can be seen in a variety of conditions including stress response,   acute inflammation, trauma and pregnancy. Correlation with other   laboratory and clinical findings is essential.         Immature Granulocytes   0.3       Lactic Acid Level   1.2  Comment: Falsely low lactic acid results can be found in samples   containing >=13.0 mg/dL total bilirubin and/or >=3.5 mg/dL   direct bilirubin.         Lymph #   0.7       Lymph %   6.4       Magnesium  2.0         MCH   29.8       MCHC   32.3       MCV   92       Mono #   0.5       Mono %   4.3       MPV   8.8       nRBC   0       Phosphorus Level 4.4         Platelet Count   292       Potassium 3.8          3.8         RBC   4.40       RDW   12.4       Sodium 139          139         TSH   0.940       WBC   11.58               Significant Imaging: I have reviewed all pertinent imaging  results/findings within the past 24 hours.          Assessment/Plan:      * Hypertensive emergency  Patient has a current diagnosis of Hypertensive emergency with end organ damage evidenced by acute kidney injury and acute heart failure which is uncontrolled.  Latest blood pressure and vitals reviewed-   Temp:  [97.4 °F (36.3 °C)-98.2 °F (36.8 °C)]   Pulse:  []   Resp:  [12-43]   BP: (115-193)/()   SpO2:  [89 %-100 %] .   Patient currently on IV antihypertensives.   Home meds for hypertension were reviewed and noted below.   Hypertension Medications      Nicardipine drip       Medication adjustment for hospital antihypertensives is as follows- patient received 1 dose of IV hydralazine was no response patient was placed on nicardipine drip admitted to ICU, antihypertensive oral medication were added.    Will aim for controlled BP reduction by medications noted above. Monitor and mitigate end organ damage as indicated.    -11/22 blood pressure improved nicardipine drip.  Oral medications were adjusted.    Leg edema  Improved  Secondary to acute on chronic exacerbation of heart failure      Acute heart failure with reduced ejection fraction (HFrEF, <= 40%)  See above      Hypertension  Patients blood pressure range in the last 24 hours was: BP  Min: 115/84  Max: 193/81.The patient's inpatient anti-hypertensive regimen is listed below:  Current Antihypertensives  furosemide injection 40 mg, Every 12 hours, Intravenous  metoprolol injection 5 mg, Every 6 hours PRN, Intravenous  carvediloL tablet 25 mg, 2 times daily, Oral  hydrALAZINE tablet 50 mg, Every 8 hours, Oral  valsartan tablet 80 mg, Daily, Oral  NIFEdipine 24 hr tablet 60 mg, Daily, Oral    Plan  - BP is controlled, will adjust as follows:  Adding nicardipine drip plus p.o. medications we will monitor close  -    Chronic systolic heart failure    Echo from 2022:  Interpretation Summary  · The left ventricle is moderately enlarged with moderate  concentric hypertrophy and severely decreased systolic function.  · The estimated ejection fraction is 30%.  · There is severe left ventricular global hypokinesis.  · Mild mitral regurgitation.  · Mild tricuspid regurgitation.  · Mild pulmonic regurgitation.  · Normal right ventricular size with normal right ventricular systolic function.  · There is no pulmonary hypertension.  · Small anterior pericardial effusion.    -patient of his medication for the past 3-4 months patient does not look like he is in acute decompensated heart failure.  -restarted him on Coreg, Lasix we will hold losartan for now due to his GILMER.  -cardiology consulted    11/22  Pending echo report  -appreciate cards recommendation, due to financial issues and able to start patient on SGLT2 inhibitor(Jardiance) or Entresto, and due to GILMER on CKD we will hold the losartan/hydrochlorothiazide.  -we will continue on Lasix  -we will start process of of applying to medicate for the patient  -follow up appointment with cardiology clinic established for possible ICD    Stage 3b chronic kidney disease (CKD)  Creatine stable for now. BMP reviewed- noted Estimated Creatinine Clearance: 48.2 mL/min (A) (based on SCr of 2.4 mg/dL (H)). according to latest data. Based on current GFR, CKD stage is stage 3 - GFR 30-59.  Monitor UOP and serial BMP and adjust therapy as needed. Renally dose meds. Avoid nephrotoxic medications and procedures.    Anemia of chronic disease  Anemia is likely due to chronic disease due to Chronic Kidney Disease. Most recent hemoglobin and hematocrit are listed below.  Recent Labs     11/21/24  1054 11/21/24  1631   HGB 12.7* 13.1*   HCT 39.0* 40.5       Plan  - Monitor serial CBC: Daily  - Transfuse PRBC if patient becomes hemodynamically unstable, symptomatic or H/H drops below 7/21.  - Patient has not received any PRBC transfusions to date  - Patient's anemia is currently stable  - we will monitor close    Acute renal failure  superimposed on stage 4 chronic kidney disease  GILMER is likely due to  intrarenal congestion secondary to chronic systolic heart failure . Baseline creatinine is  2 . Most recent creatinine and eGFR are listed below.  Recent Labs     11/21/24  1054 11/22/24  0713   CREATININE 2.3* 2.4*  2.4*   EGFRNORACEVR 35* 33*  33*        Plan  - GILMER is stable  - Avoid nephrotoxins and renally dose meds for GFR listed above  - Monitor urine output, serial BMP, and adjust therapy as needed  - keep map over 65  -we will continue to hold losartan/hydrochlorothiazide dizzy to worsening of kidney failure      VTE Risk Mitigation (From admission, onward)           Ordered     heparin (porcine) injection 5,000 Units  Every 8 hours         11/21/24 1306     IP VTE HIGH RISK PATIENT  Once         11/21/24 1306     Place sequential compression device  Until discontinued         11/21/24 1306                    Discharge Planning   CHRIS:      Code Status: Full Code   Is the patient medically ready for discharge?:     Reason for patient still in hospital (select all that apply): Patient unstable  Discharge Plan A: Home            Critical care time spent on the evaluation and treatment of severe organ dysfunction, review of pertinent labs and imaging studies, discussions with consulting providers and discussions with patient/family: 45 minutes.      Victorina Garcia MD  Department of Hospital Medicine   [facility] - Intensive Care

## 2024-11-22 NOTE — ASSESSMENT & PLAN NOTE
GILMER is likely due to  intrarenal congestion secondary to chronic systolic heart failure . Baseline creatinine is  2 . Most recent creatinine and eGFR are listed below.  Recent Labs     11/21/24  1054 11/22/24  0713   CREATININE 2.3* 2.4*  2.4*   EGFRNORACEVR 35* 33*  33*        Plan  - GILMER is stable  - Avoid nephrotoxins and renally dose meds for GFR listed above  - Monitor urine output, serial BMP, and adjust therapy as needed  - keep map over 65  -we will continue to hold losartan/hydrochlorothiazide dizzy to worsening of kidney failure

## 2024-11-22 NOTE — CONSULTS
Food & Nutrition  Education    Diet Education: Heart failure nutrition therapy   Time Spent: 15 minutes   Learners: pt       Nutrition Education provided with handouts:Heart Failure nutrition therapy       Comments: Educated pt on importance of following a low sodium diet by reading nutrition labels, types of foods consumed, and different types of cooking/seasoning methods. Left handout at pt's bedside.     All questions and concerns answered. Dietitian's contact information provided.       Follow-Up: 1x/week    Please Re-consult as needed        Thanks!

## 2024-11-22 NOTE — ASSESSMENT & PLAN NOTE
Anemia is likely due to chronic disease due to Chronic Kidney Disease. Most recent hemoglobin and hematocrit are listed below.  Recent Labs     11/21/24  1054 11/21/24  1631   HGB 12.7* 13.1*   HCT 39.0* 40.5       Plan  - Monitor serial CBC: Daily  - Transfuse PRBC if patient becomes hemodynamically unstable, symptomatic or H/H drops below 7/21.  - Patient has not received any PRBC transfusions to date  - Patient's anemia is currently stable  - we will monitor close

## 2024-11-22 NOTE — ASSESSMENT & PLAN NOTE
Creatine stable for now. BMP reviewed- noted Estimated Creatinine Clearance: 48.2 mL/min (A) (based on SCr of 2.4 mg/dL (H)). according to latest data. Based on current GFR, CKD stage is stage 3 - GFR 30-59.  Monitor UOP and serial BMP and adjust therapy as needed. Renally dose meds. Avoid nephrotoxic medications and procedures.

## 2024-11-22 NOTE — SUBJECTIVE & OBJECTIVE
Interval History:  Patient was seen in the morning alert and oriented x4 breathing room air no distress blood pressure is controlled nicardipine drip was stopped, home blood pressure medications were adjusted.    Review of Systems   Constitutional:  Negative for activity change, diaphoresis, fatigue and fever.   HENT: Negative.     Eyes: Negative.    Respiratory:  Negative for shortness of breath and wheezing.    Cardiovascular:  Negative for chest pain, palpitations and leg swelling.   Gastrointestinal: Negative.    Endocrine: Negative.    Genitourinary: Negative.    Musculoskeletal: Negative.    Skin: Negative.    Allergic/Immunologic: Negative.    Neurological: Negative.    Hematological: Negative.    Psychiatric/Behavioral: Negative.       Objective:     Vital Signs (Most Recent):  Temp: 98.2 °F (36.8 °C) (11/22/24 1505)  Pulse: 76 (11/22/24 1608)  Resp: (!) 24 (11/22/24 1608)  BP: (!) 159/100 (11/22/24 1600)  SpO2: 97 % (11/22/24 1608) Vital Signs (24h Range):  Temp:  [97.4 °F (36.3 °C)-98.2 °F (36.8 °C)] 98.2 °F (36.8 °C)  Pulse:  [] 76  Resp:  [12-43] 24  SpO2:  [89 %-100 %] 97 %  BP: (115-193)/() 159/100     Weight: 116.5 kg (256 lb 13.4 oz)  Body mass index is 39.05 kg/m².    Intake/Output Summary (Last 24 hours) at 11/22/2024 1609  Last data filed at 11/22/2024 1608  Gross per 24 hour   Intake 1780.87 ml   Output 2226 ml   Net -445.13 ml         Physical Exam  Constitutional:       Appearance: He is obese.   HENT:      Head: Normocephalic and atraumatic.      Nose: Nose normal.      Mouth/Throat:      Mouth: Mucous membranes are moist.      Pharynx: Oropharynx is clear.   Eyes:      Extraocular Movements: Extraocular movements intact.      Pupils: Pupils are equal, round, and reactive to light.   Cardiovascular:      Rate and Rhythm: Normal rate and regular rhythm.      Heart sounds: No murmur heard.     No friction rub. No gallop.   Pulmonary:      Effort: No respiratory distress.      Breath  sounds: No stridor. No wheezing, rhonchi or rales.   Chest:      Chest wall: No tenderness.   Abdominal:      General: Abdomen is flat.      Palpations: Abdomen is soft.   Musculoskeletal:         General: Normal range of motion.      Cervical back: Normal range of motion.      Right lower leg: No edema.      Left lower leg: No edema.   Skin:     General: Skin is warm.      Capillary Refill: Capillary refill takes less than 2 seconds.   Neurological:      General: No focal deficit present.      Mental Status: He is alert and oriented to person, place, and time.   Psychiatric:         Mood and Affect: Mood normal.         Behavior: Behavior normal.             Significant Labs: All pertinent labs within the past 24 hours have been reviewed.  Recent Lab Results         11/22/24  0713   11/21/24  1631        Anion Gap 13          13         Baso #   0.02       Basophil %   0.2       BUN 32          32         Calcium 9.1          9.1         Chloride 106          106         CO2 20          20         Creatinine 2.4          2.4         Differential Method   Automated       eGFR 33          33         Eos #   0.0       Eos %   0.2       Estimated Avg Glucose   88       Free T4   1.07       Glucose 97          97         Gran # (ANC)   10.3       Gran %   88.6       Hematocrit   40.5       Hemoglobin   13.1       Hemoglobin A1C External   4.7  Comment: ADA Screening Guidelines:  5.7-6.4%  Consistent with prediabetes  >or=6.5%  Consistent with diabetes    High levels of fetal hemoglobin interfere with the HbA1C  assay. Heterozygous hemoglobin variants (HbS, HgC, etc)do  not significantly interfere with this assay.   However, presence of multiple variants may affect accuracy.         Immature Grans (Abs)   0.04  Comment: Mild elevation in immature granulocytes is non specific and   can be seen in a variety of conditions including stress response,   acute inflammation, trauma and pregnancy. Correlation with other    laboratory and clinical findings is essential.         Immature Granulocytes   0.3       Lactic Acid Level   1.2  Comment: Falsely low lactic acid results can be found in samples   containing >=13.0 mg/dL total bilirubin and/or >=3.5 mg/dL   direct bilirubin.         Lymph #   0.7       Lymph %   6.4       Magnesium  2.0         MCH   29.8       MCHC   32.3       MCV   92       Mono #   0.5       Mono %   4.3       MPV   8.8       nRBC   0       Phosphorus Level 4.4         Platelet Count   292       Potassium 3.8          3.8         RBC   4.40       RDW   12.4       Sodium 139          139         TSH   0.940       WBC   11.58               Significant Imaging: I have reviewed all pertinent imaging results/findings within the past 24 hours.

## 2024-11-22 NOTE — PLAN OF CARE-OVED
VN cued into patient's room for introduction with patient's permission.  VN role explained and informed patient/family that VN would be working with bedside nurse and rest of care team.  Plan of care reviewed, pt encouraged to refer to whiteboard to determine staff for the day and pt/family educated on VTE,  fall risk and advised to call for assistance at all times to ensure pt's safety. Pt is without any signs of pain, anxiety, dyspnea or nausea. Patient's chart, labs and vital signs reviewed.  Allowed time for questions.  Will continue to be available as needed.

## 2024-11-22 NOTE — PT/OT/SLP EVAL
Occupational Therapy   Evaluation and Discharge Note    Name: Abe Pak  MRN: [id number]  Admitting Diagnosis: Hypertensive emergency  Recent Surgery: * No surgery found *      Recommendations:     Discharge Recommendations: No Therapy Indicated  Discharge Equipment Recommendations: none  Barriers to discharge:  None    Assessment:    Per OT eval, pt w/o change in fxnl status from baseline (I) & no further acute OT svcs indicated at this time. DC acute OT.    Abe Pak is a 43 y.o. male with a medical diagnosis of Hypertensive emergency. At this time, patient is functioning at their prior level of function and does not require further acute OT services.     Plan:     During this hospitalization, patient does not require further acute OT services.  Please re-consult if situation changes.    Plan of Care Reviewed with: patient    Subjective     Chief Complaint: HTN  Patient/Family Comments/goals: return home    Occupational Profile:  Living Environment: w/ mom in 1st fl apt w/ 0STE; t/s w/ GB  Previous level of function: (I)  Roles and Routines: IADLs; driving & works  Equipment Used at home: grab bar  Assistance upon Discharge: mom    Pain/Comfort:  Pain Rating 1: 0/10  Pain Rating Post-Intervention 1: 0/10    Patients cultural, spiritual, Holiness conflicts given the current situation:      Objective:     Communicated with: nsg prior to session.  Patient found HOB elevated with bed alarm, blood pressure cuff, peripheral IV, pulse ox (continuous), telemetry upon OT entry to room.    General Precautions: Standard, fall  Orthopedic Precautions: N/A  Braces: N/A  Respiratory Status: Room air     Occupational Performance:    Bed Mobility:    Patient completed Supine to Sit with modified independence and supervision    Functional Mobility/Transfers:  Patient completed Sit <> Stand Transfer with modified independence and supervision  with  no assistive device   Patient completed Bed <> Chair Transfer using Step  Transfer technique with modified independence and supervision with no assistive device  Functional Mobility: w/o DME for short distance w/in room w/ S-MI    Activities of Daily Living:  Lower Body Dressing: independence doff/don socks    Cognitive/Visual Perceptual:  AO4    No signif deficits noted    Physical Exam:  BUEs WFL at 5/5    Sit balance: G  Stand balance: G    Coordination: grossly intact  Sensation: grossly intact  Endurance: G-    AMPAC 6 Click ADL:  AMPAC Total Score: 24    Treatment & Education:   Pt found in supine & agreeable to OT eval/dc this date.  Pt w/o c/o pain & perf the following:  -sup-->EOB w/ S-MI & maintained w/ MI  -standing & short distance fxnl mobility for t/f-->b/s chair w/o amb DME w/ S-MI  -doff/don socks via figure 4 tech w/ (I)  Edu/tx re: general safety techs & HEP. Pt verbalized understanding.    Patient left up in chair with all lines intact, call button in reach, and nsg present    GOALS:   Multidisciplinary Problems       Occupational Therapy Goals       Not on file              Multidisciplinary Problems (Resolved)          Problem: Occupational Therapy    Goal Priority Disciplines Outcome Interventions   Occupational Therapy Goal   (Resolved)     OT, PT/OT Met                        History:     Past Medical History:   Diagnosis Date    CHF (congestive heart failure)     Hypertension        History reviewed. No pertinent surgical history.    Time Tracking:     OT Date of Treatment: 11/22/24  OT Start Time: 0931  OT Stop Time: 0944  OT Total Time (min): 13 min    Billable Minutes:Evaluation 13  Total Time 13    11/22/2024

## 2024-11-22 NOTE — PLAN OF CARE
Problem: Occupational Therapy  Goal: Occupational Therapy Goal  Outcome: Met   Pt found in supine & agreeable to OT eval/dc this date.  Pt w/o c/o pain & perf the following:  -sup-->EOB w/ S-MI & maintained w/ MI  -standing & short distance fxnl mobility for t/f-->b/s chair w/o amb DME w/ S-MI  -doff/don socks via figure 4 tech w/ (I)  Edu/tx re: general safety techs & HEP. Pt verbalized understanding.    Per OT eval, pt w/o change in fxnl status from baseline (I) & no further acute OT svcs indicated at this time. DC acute OT.

## 2024-11-22 NOTE — PLAN OF CARE
Problem: Physical Therapy  Goal: Physical Therapy Goal  Outcome: Met    Patient evaluated; at baseline pt is independent with all transfers, ADLs and amb without AD, driving, working; during today's session, found pt up in chair; BP taken at 145/96 HR 79 SpO2 100%; pt independent with sit to stand and amb in room~120ft with fair pace, no c/o lightheadedness/dizziness, SOB or chest pain; while amb, SpO2 97-95% HR 89-82; patient returned to supine for echo testing; BP taken at 147/98 HR 76 SpO2 99%; pt educated on pacing self with activities, pursed lip breathing with exertional activity, taking his medications as prescribed; patient pt found to bed functioning at baseline; no further acute PT services indicated at this time.

## 2024-11-22 NOTE — ASSESSMENT & PLAN NOTE
Echo from 2022:  Interpretation Summary  · The left ventricle is moderately enlarged with moderate concentric hypertrophy and severely decreased systolic function.  · The estimated ejection fraction is 30%.  · There is severe left ventricular global hypokinesis.  · Mild mitral regurgitation.  · Mild tricuspid regurgitation.  · Mild pulmonic regurgitation.  · Normal right ventricular size with normal right ventricular systolic function.  · There is no pulmonary hypertension.  · Small anterior pericardial effusion.    -patient of his medication for the past 3-4 months patient does not look like he is in acute decompensated heart failure.  -restarted him on Coreg, Lasix we will hold losartan for now due to his GILMER.  -cardiology consulted    11/22  Pending echo report  -appreciate cards recommendation, due to financial issues and able to start patient on SGLT2 inhibitor(Jardiance) or Entresto, and due to GILMER on CKD we will hold the losartan/hydrochlorothiazide.  -we will continue on Lasix  -we will start process of of applying to medicate for the patient  -follow up appointment with cardiology clinic established for possible ICD

## 2024-11-22 NOTE — ASSESSMENT & PLAN NOTE
Patient has a current diagnosis of Hypertensive emergency with end organ damage evidenced by acute kidney injury and acute heart failure which is uncontrolled.  Latest blood pressure and vitals reviewed-   Temp:  [97.4 °F (36.3 °C)-98.2 °F (36.8 °C)]   Pulse:  []   Resp:  [12-43]   BP: (115-193)/()   SpO2:  [89 %-100 %] .   Patient currently on IV antihypertensives.   Home meds for hypertension were reviewed and noted below.   Hypertension Medications      Nicardipine drip       Medication adjustment for hospital antihypertensives is as follows- patient received 1 dose of IV hydralazine was no response patient was placed on nicardipine drip admitted to ICU, antihypertensive oral medication were added.    Will aim for controlled BP reduction by medications noted above. Monitor and mitigate end organ damage as indicated.    -11/22 blood pressure improved nicardipine drip.  Oral medications were adjusted.

## 2024-11-22 NOTE — CONSULTS
" U Cardiology Consult Note     Cardiology Attending: Dr. Andrews  Cardiology Fellow: Dr. Antoine  Cardiology Resident: Dr. Bahena    Date of Admit: 11/21/2024  Date of Consult: 11/22/2024    Reason for Consultation:     "CHF"    History of Present Illness:      Abe Pak is a 43 y.o. male with a PMH significant for NICM-HFrEF (LVEF 30% in 2022), HTN, HLD, CKD3b who presented to [facility] for 2 days of shortness of breath with exertion and increased lower extremity edema. He had stopped taking his medications a few months ago because he started to "feel better" and has also had financial constraints as he currently does not have insurance. He states he can typically walk up to 4 blocks before feeling short winded, and he works as a . He denies any chest pain, palpitations, lightheadedness, or syncopal events.    He had a similar admission in 2022 for hypertensive emergency. Was previously seen by [facility] cardiology in 03/2023. At that point, he had a TTE with a severely decreased systolic function (30%) and global LV hypokinesis, with plans for a cMRI that did not come to fruition.    On arrival this admit, he was hypertensive (221/139), sinus tachycardic (115), satting well with no oxygen requirement. Reported to have significant lower extremity edema. Labs remarkable for elevated troponin and BNP and a CXR suggesting cardiomegaly with increased interstitial markings. He was admitted to the ICU on a Cardene gtt and diuresed roughly ~3.5L with IV Lasix. Cardiology has been consulted for CHF management.     Past Medical History:     Past Medical History:   Diagnosis Date    CHF (congestive heart failure)     Hypertension      Surgical History:   History reviewed. No pertinent surgical history.  Allergies:   Review of patient's allergies indicates:  No Known Allergies  Family History:   No family history on file.  Social History:     Social History     Tobacco Use    Smoking status: Former    Smokeless " tobacco: Never   Substance Use Topics    Alcohol use: Yes     Comment: occasional    Drug use: No     Review of Systems:     Pertinent findings noted in HPI. All other systems were reviewed and were negative.     Medications:     Home Medications:    Has not been taking the following prescribed medications:  - Atorvastatin 40  - Lasix 40  - Valsartan 80 BID  - Coreg 25 BID  - Isordil 20 TID  - Hydralazine 100 TID    Current/Inpatient Medications:  Infusions:   nicardipine  0-15 mg/hr Intravenous Continuous   Stopped at 11/21/24 1805     Scheduled:   amLODIPine  10 mg Oral BID    aspirin  81 mg Oral Daily    atorvastatin  40 mg Oral Daily    carvediloL  25 mg Oral BID    furosemide (LASIX) injection  40 mg Intravenous Q12H    heparin (porcine)  5,000 Units Subcutaneous Q8H    hydrALAZINE  50 mg Oral Q8H    polyethylene glycol  17 g Oral Daily    sucralfate  1 g Oral QID (AC & HS)    valsartan  80 mg Oral Daily     PRN:    Current Facility-Administered Medications:     acetaminophen, 650 mg, Oral, Q4H PRN    [START ON 11/23/2024] influenza, 0.5 mL, Intramuscular, vaccine x 1 dose    magnesium oxide, 800 mg, Oral, PRN    magnesium oxide, 800 mg, Oral, PRN    melatonin, 6 mg, Oral, Nightly PRN    metoprolol, 5 mg, Intravenous, Q6H PRN    morphine, 2 mg, Intravenous, Q4H PRN    ondansetron, 4 mg, Intravenous, Q8H PRN    oxyCODONE, 10 mg, Oral, Q4H PRN    [START ON 11/23/2024] pneumoc 20-haile conj-dip cr(PF), 0.5 mL, Intramuscular, vaccine x 1 dose    potassium bicarbonate, 35 mEq, Oral, PRN    potassium bicarbonate, 50 mEq, Oral, PRN    potassium bicarbonate, 60 mEq, Oral, PRN    potassium, sodium phosphates, 2 packet, Oral, PRN    potassium, sodium phosphates, 2 packet, Oral, PRN    potassium, sodium phosphates, 2 packet, Oral, PRN    sodium chloride 0.9%, 10 mL, Intravenous, PRN    sodium chloride 0.9%, 10 mL, Intravenous, PRN    Objective:     24-hour Vitals:   Temp:  [97.4 °F (36.3 °C)-98.6 °F (37 °C)] 98.2 °F (36.8  "°C)  Pulse:  [] 85  Resp:  [12-43] 29  SpO2:  [89 %-100 %] 97 %  BP: (125-221)/() 162/109    Vitals: BP (!) 162/109   Pulse 85   Temp 98.2 °F (36.8 °C) (Oral)   Resp (!) 29   Ht 5' 8" (1.727 m)   Wt 116.5 kg (256 lb 13.4 oz)   SpO2 97%   BMI 39.05 kg/m²     Intake/Output Summary (Last 24 hours) at 11/22/2024 0934  Last data filed at 11/22/2024 0915  Gross per 24 hour   Intake 930.87 ml   Output 5025 ml   Net -4094.13 ml       Physical Exam:    General: No acute distress, resting comfortably   HEENT: Atraumatic, normocephalic, moist mucous membranes  Cardiovascular: Regular rate and rhythm, normal S1 and S2, no extra heart sounds or murmurs appreciated   Respiratory: Stable on room air, normal work of breathing, no conversational dyspnea, no accessory muscle use noted, clear to auscultation bilaterally, no wheezes or crackles   Abdominal: Non-distended, soft, normoactive bowel sounds, non-tender to palpation, no guarding  Extremities: Atraumatic, +1 pitting edema in BLE, moving all four extremities  Pulses: 2+ and symmetric radial artery, dorsalis pedis artery  Skin: Non-diaphoretic, warm, dry    Labs:   I have reviewed the following labs below:    Recent Labs   Lab 11/21/24  1054 11/21/24  1631 11/22/24  0713   WBC 8.31 11.58  --    HGB 12.7* 13.1*  --    HCT 39.0* 40.5  --     292  --      --  139  139   K 3.7  --  3.8  3.8     --  106  106   CO2 20*  --  20*  20*   BUN 31*  --  32*  32*   CREATININE 2.3*  --  2.4*  2.4*   GLU 99  --  97  97   CALCIUM 9.6  --  9.1  9.1   MG  --   --  2.0   PHOS  --   --  4.4   PROT 7.1  --   --    ALBUMIN 3.9  --   --    BILITOT 0.7  --   --    AST 23  --   --    ALT 27  --   --    ALKPHOS 88  --   --    TROPONINI 0.063*  --   --    *  --   --      Cardiovascular Studies/Imaging:   I have reviewed the following studies below:    ECG (11/21/2024):  Sinus tachycardia with T wave inversions in V5/V6. No significant changes from " prior EKG. Since last EKG, TWI in anterior leads no longer present.    TTE (10/07/2022):   The left ventricle is moderately enlarged with moderate concentric hypertrophy and severely decreased systolic function.  The estimated ejection fraction is 30%.  There is severe left ventricular global hypokinesis.  Mild mitral regurgitation.  Mild tricuspid regurgitation.  Mild pulmonic regurgitation.  Normal right ventricular size with normal right ventricular systolic function.  There is no pulmonary hypertension.  Small anterior pericardial effusion.    LHC/Cors:  None available    Imaging:   X-Ray Chest 1 View    Result Date: 11/21/2024  Cardiomegaly with diffuse interstitial markings bilaterally    Assessment:     Abe Pak is a 43 y.o. male with a PMH significant for NICM-HFrEF (LVEF 30% in 2022), HTN, HLD, CKD3b who is admitted to the ICU for hypertensive emergency. Clinical findings are suggestive of an acute on chronic combined systolic and diastolic heart failure exacerbation in the setting of uncontrolled hypertension.     Plan/Recommendations:     - NYHA Class II, ACC/AHA Stage C  - Etiology for HFrEF previously documented as NICM; [facility] records unobtainable.  TTE (10/2022) with EF 30% and severe global LV hypokinesis.  - Exacerbation likely secondary to poorly controlled hypertension with resultant hypertensive emergency  - Troponinemia likely due to supply-demand mismatch in the setting of significantly elevated afterload  - Follow up TTE  - Continue diuresis until euvolemic; robust diuretic response noted  - Recommend starting GDMT: Jardiance 10, Aldactone 12.5 (renally dosed), Entresto; continue Coreg 25 BID. If there are financial constraints, can replace Entresto with Losartan-HCTZ 100-25  - Would not hold medications while SBP>90; can hold hydralazine if SBP<90  - Would discontinue amlodipine 5 BID  - Previously on hydralazine 100 TID, Isordil 20 TID; can re-introduce when able  - Continued  management of hypertensive emergency per primary  - Nutrition consult to discuss low-salt cardiac diet  - Please place referral to Dr. Paul on discharge for ICD evaluation  - Would benefit from an outpatient polysomnography, likely ROSEMARIE component contributing to hypertension  - Daily weights, strict I/O, telemetry  - Goal K>4, Mg>2    Ke Bahena DO  [facility] Internal Medicine, PGY-2  [facility] Cardiology Service  11/22/2024 7:20 AM  [facility]

## 2024-11-22 NOTE — PLAN OF CARE
Problem: Adult Inpatient Plan of Care  Goal: Plan of Care Review  Outcome: Progressing  Goal: Patient-Specific Goal (Individualized)  Reactivated  Goal: Absence of Hospital-Acquired Illness or Injury  Reactivated  Goal: Optimal Comfort and Wellbeing  Outcome: Progressing  Goal: Readiness for Transition of Care  Outcome: Progressing     Problem: Acute Kidney Injury/Impairment  Goal: Fluid and Electrolyte Balance  Outcome: Progressing  Goal: Improved Oral Intake  Reactivated  Goal: Effective Renal Function  Reactivated

## 2024-11-22 NOTE — PLAN OF CARE
" went to meet with patient. Patient reports he is independent and lives at home with his mom. He works detailing cars. Patient does not use any DME or have Home Health. He still drives. Patient does not have any insurance. Samra with Medicaid was working to contact patient. I also provided patient with her number to call to be screened. Patient reports he has been seen by a Cardiologist in the past. Per chart review, seen at [facility] March 2023. They had referred him to [facility] since no insurance.  asked  to check if any appointments scheduled at [facility] for PCP and Cardiology. Patient reports he felt "better" so that is why he stopped taking his medications.  stressed importance to patient to take his medications as prescribed. Patient encouraged to call with any questions or concerns.  will continue to follow patient through transitions of care and assist with any discharge needs.     Per , only [facility] Cardiology follow-up previously scheduled. [facility] appointment for PCP scheduled. I met with patient and discussed/reviewed follow-up information. All questions answered.    Other Contacts    Name Relation Home Work Mobile   Ave Pak Mother   [phone]   Tatianna Campos Relative [phone]         Dr Shamika Mayes on 1/9 at 1:30     [facility] [address] ([address]) [place] [address] Phone Number: [phone]      Next Steps: Follow up on 1/9/2025  Instructions: at 1:30 pm--Cardiology Follow-Up at [facility] previously scheduled.    Smiley Fernandez MD  Family Medicine [phone] [address] [facility] [id number]     Next Steps: Follow up on 11/26/2024  Instructions: at 1:00 pm [facility] opening next week so if time does not work he can call clinic, they have many openings.  Patient can transfer to [facility] for future care.  They are " scheduling 3 weeks out for new patients.  NOTE: Patient needs to bring in photo Hospital summary, ID, Ins Card, and Medicine bottles        11/22/24 1063   Discharge Assessment   Assessment Type Discharge Planning Assessment   Confirmed/corrected address, phone number and insurance Yes   Confirmed Demographics Correct on Facesheet   Source of Information patient   People in Home parent(s)   Facility Arrived From: Home   Do you expect to return to your current living situation? Yes   Do you have help at home or someone to help you manage your care at home? Yes   Who are your caregiver(s) and their phone number(s)? Ave Mother   [phone]  Tatianna Campos Relative [phone]   Prior to hospitilization cognitive status: Alert/Oriented   Current cognitive status: Alert/Oriented   Walking or Climbing Stairs Difficulty no   Dressing/Bathing Difficulty no   Equipment Currently Used at Home none   Do you take prescription medications? No   Do you have prescription coverage? No   Do you have any problems affording any of your prescribed medications? TBD   Is the patient taking medications as prescribed? no   How do you get to doctors appointments? car, drives self   Are you on dialysis? No   Do you take coumadin? No   Discharge Plan A Home   Discharge Plan B Home with family   DME Needed Upon Discharge  none   Discharge Plan discussed with: Patient   Transition of Care Barriers Does not adhere to care plan;Unisured   Physical Activity   On average, how many days per week do you engage in moderate to strenuous exercise (like a brisk walk)? Pt Declined   On average, how many minutes do you engage in exercise at this level? Pt Declined   Financial Resource Strain   How hard is it for you to pay for the very basics like food, housing, medical care, and heating? Not hard   Housing Stability   In the last 12 months, was there a time when you were not able to pay the mortgage or rent on time? N   At any time in the past 12  months, were you homeless or living in a shelter (including now)? N   Transportation Needs   Has the lack of transportation kept you from medical appointments, meetings, work or from getting things needed for daily living? No   Food Insecurity   Within the past 12 months, you worried that your food would run out before you got the money to buy more. Never true   Within the past 12 months, the food you bought just didn't last and you didn't have money to get more. Never true   Stress   Do you feel stress - tense, restless, nervous, or anxious, or unable to sleep at night because your mind is troubled all the time - these days? Pt Declined   Social Isolation   How often do you feel lonely or isolated from those around you?  Patient declined   Alcohol Use   Q1: How often do you have a drink containing alcohol? Pt Declined   Q2: How many drinks containing alcohol do you have on a typical day when you are drinking? Pt Declined   Q3: How often do you have six or more drinks on one occasion? Pt Declined   Utilities   In the past 12 months has the electric, gas, oil, or water company threatened to shut off services in your home? No   OTHER   Name(s) of People in Home Ave Pak Mother   [phone]  Tatianna Campos Relative [phone]     Hortencia Smiley RN    [phone]

## 2024-11-23 LAB
ANION GAP SERPL CALC-SCNC: 11 MMOL/L (ref 8–16)
ANION GAP SERPL CALC-SCNC: 11 MMOL/L (ref 8–16)
BUN SERPL-MCNC: 36 MG/DL (ref 6–20)
BUN SERPL-MCNC: 36 MG/DL (ref 6–20)
CALCIUM SERPL-MCNC: 9.2 MG/DL (ref 8.7–10.5)
CALCIUM SERPL-MCNC: 9.2 MG/DL (ref 8.7–10.5)
CHLORIDE SERPL-SCNC: 105 MMOL/L (ref 95–110)
CHLORIDE SERPL-SCNC: 105 MMOL/L (ref 95–110)
CO2 SERPL-SCNC: 24 MMOL/L (ref 23–29)
CO2 SERPL-SCNC: 24 MMOL/L (ref 23–29)
CREAT SERPL-MCNC: 2.7 MG/DL (ref 0.5–1.4)
CREAT SERPL-MCNC: 2.7 MG/DL (ref 0.5–1.4)
EST. GFR  (NO RACE VARIABLE): 29 ML/MIN/1.73 M^2
EST. GFR  (NO RACE VARIABLE): 29 ML/MIN/1.73 M^2
GLUCOSE SERPL-MCNC: 100 MG/DL (ref 70–110)
GLUCOSE SERPL-MCNC: 100 MG/DL (ref 70–110)
MAGNESIUM SERPL-MCNC: 2.1 MG/DL (ref 1.6–2.6)
PHOSPHATE SERPL-MCNC: 3.8 MG/DL (ref 2.7–4.5)
POTASSIUM SERPL-SCNC: 3.7 MMOL/L (ref 3.5–5.1)
POTASSIUM SERPL-SCNC: 3.7 MMOL/L (ref 3.5–5.1)
SODIUM SERPL-SCNC: 140 MMOL/L (ref 136–145)
SODIUM SERPL-SCNC: 140 MMOL/L (ref 136–145)

## 2024-11-23 PROCEDURE — 83735 ASSAY OF MAGNESIUM: CPT

## 2024-11-23 PROCEDURE — 36415 COLL VENOUS BLD VENIPUNCTURE: CPT

## 2024-11-23 PROCEDURE — 11000001 HC ACUTE MED/SURG PRIVATE ROOM

## 2024-11-23 PROCEDURE — 94761 N-INVAS EAR/PLS OXIMETRY MLT: CPT

## 2024-11-23 PROCEDURE — 63600175 PHARM REV CODE 636 W HCPCS

## 2024-11-23 PROCEDURE — 84100 ASSAY OF PHOSPHORUS: CPT

## 2024-11-23 PROCEDURE — 25000003 PHARM REV CODE 250

## 2024-11-23 PROCEDURE — 80048 BASIC METABOLIC PNL TOTAL CA: CPT

## 2024-11-23 PROCEDURE — 99900035 HC TECH TIME PER 15 MIN (STAT)

## 2024-11-23 RX ADMIN — CARVEDILOL 25 MG: 25 TABLET, FILM COATED ORAL at 09:11

## 2024-11-23 RX ADMIN — MUPIROCIN: 20 OINTMENT TOPICAL at 08:11

## 2024-11-23 RX ADMIN — CARVEDILOL 25 MG: 25 TABLET, FILM COATED ORAL at 08:11

## 2024-11-23 RX ADMIN — SUCRALFATE 1 G: 1 TABLET ORAL at 04:11

## 2024-11-23 RX ADMIN — FUROSEMIDE 40 MG: 10 INJECTION, SOLUTION INTRAMUSCULAR; INTRAVENOUS at 08:11

## 2024-11-23 RX ADMIN — ASPIRIN 81 MG CHEWABLE TABLET 81 MG: 81 TABLET CHEWABLE at 08:11

## 2024-11-23 RX ADMIN — SUCRALFATE 1 G: 1 TABLET ORAL at 09:11

## 2024-11-23 RX ADMIN — MUPIROCIN: 20 OINTMENT TOPICAL at 09:11

## 2024-11-23 RX ADMIN — HYDRALAZINE HYDROCHLORIDE 50 MG: 25 TABLET ORAL at 02:11

## 2024-11-23 RX ADMIN — SUCRALFATE 1 G: 1 TABLET ORAL at 11:11

## 2024-11-23 RX ADMIN — NIFEDIPINE 60 MG: 30 TABLET, FILM COATED, EXTENDED RELEASE ORAL at 08:11

## 2024-11-23 RX ADMIN — HYDRALAZINE HYDROCHLORIDE 50 MG: 25 TABLET ORAL at 05:11

## 2024-11-23 RX ADMIN — ATORVASTATIN CALCIUM 40 MG: 40 TABLET, FILM COATED ORAL at 08:11

## 2024-11-23 RX ADMIN — SUCRALFATE 1 G: 1 TABLET ORAL at 05:11

## 2024-11-23 RX ADMIN — HEPARIN SODIUM 5000 UNITS: 5000 INJECTION INTRAVENOUS; SUBCUTANEOUS at 05:11

## 2024-11-23 RX ADMIN — HEPARIN SODIUM 5000 UNITS: 5000 INJECTION INTRAVENOUS; SUBCUTANEOUS at 02:11

## 2024-11-23 RX ADMIN — HEPARIN SODIUM 5000 UNITS: 5000 INJECTION INTRAVENOUS; SUBCUTANEOUS at 09:11

## 2024-11-23 RX ADMIN — FUROSEMIDE 40 MG: 10 INJECTION, SOLUTION INTRAMUSCULAR; INTRAVENOUS at 09:11

## 2024-11-23 NOTE — NURSING
Patient transferred from ICU.  Vital signs stable. Discussed team and staff expectations.  Room orientation done. Patient with no complaints of pain.  Patient educated to call for assistance to restroom. Patient verbalized and understanding. Patient up in chair eating dinner, call light in reach. Will continue plan of care.

## 2024-11-23 NOTE — SUBJECTIVE & OBJECTIVE
Interval History:  Patient was seen in the morning   Denies any SOB,CP, bp is still elevated at time of exam, worsening GILMER.      Review of Systems   Constitutional:  Negative for activity change, diaphoresis, fatigue and fever.   HENT: Negative.     Eyes: Negative.    Respiratory:  Negative for shortness of breath and wheezing.    Cardiovascular:  Negative for chest pain, palpitations and leg swelling.   Gastrointestinal: Negative.    Endocrine: Negative.    Genitourinary: Negative.    Musculoskeletal: Negative.    Skin: Negative.    Allergic/Immunologic: Negative.    Neurological: Negative.    Hematological: Negative.    Psychiatric/Behavioral: Negative.       Objective:     Vital Signs (Most Recent):  Temp: 98.6 °F (37 °C) (11/23/24 1154)  Pulse: 76 (11/23/24 1249)  Resp: 18 (11/23/24 1154)  BP: 125/75 (11/23/24 1154)  SpO2: 96 % (11/23/24 1154) Vital Signs (24h Range):  Temp:  [97.7 °F (36.5 °C)-99.2 °F (37.3 °C)] 98.6 °F (37 °C)  Pulse:  [70-91] 76  Resp:  [16-31] 18  SpO2:  [94 %-99 %] 96 %  BP: (125-176)/() 125/75     Weight: 116.1 kg (256 lb)  Body mass index is 38.92 kg/m².    Intake/Output Summary (Last 24 hours) at 11/23/2024 1433  Last data filed at 11/22/2024 1608  Gross per 24 hour   Intake 240 ml   Output --   Net 240 ml         Physical Exam  Constitutional:       Appearance: He is obese.   HENT:      Head: Normocephalic and atraumatic.      Nose: Nose normal.      Mouth/Throat:      Mouth: Mucous membranes are moist.      Pharynx: Oropharynx is clear.   Eyes:      Extraocular Movements: Extraocular movements intact.      Pupils: Pupils are equal, round, and reactive to light.   Cardiovascular:      Rate and Rhythm: Normal rate and regular rhythm.      Heart sounds: No murmur heard.     No friction rub. No gallop.   Pulmonary:      Effort: No respiratory distress.      Breath sounds: No stridor. No wheezing, rhonchi or rales.   Chest:      Chest wall: No tenderness.   Abdominal:      General:  Abdomen is flat.      Palpations: Abdomen is soft.   Musculoskeletal:         General: Normal range of motion.      Cervical back: Normal range of motion.      Right lower leg: No edema.      Left lower leg: No edema.   Skin:     General: Skin is warm.      Capillary Refill: Capillary refill takes less than 2 seconds.   Neurological:      General: No focal deficit present.      Mental Status: He is alert and oriented to person, place, and time.   Psychiatric:         Mood and Affect: Mood normal.         Behavior: Behavior normal.             Significant Labs: All pertinent labs within the past 24 hours have been reviewed.  Recent Lab Results         11/23/24  0353   11/22/24  1651        A2C EF   32       A4C EF   33       Anion Gap 11          11         Ascending aorta   3.80       Ao peak eran   1.2       Ao VTI   17.8       AV valve area   3.3       SILVANA by Velocity Ratio   2.9       AV mean gradient   3.4       AV index (prosthetic)   0.66       AV peak gradient   5.8       AV Velocity Ratio   0.58       BSA   2.36       BUN 36          36         Calcium 9.2          9.2         Chloride 105          105         CO2 24          24         Creatinine 2.7          2.7         Left Ventricle Relative Wall Thickness   0.53       E/A ratio   1.14       E/E' ratio   20.60       eGFR 29          29         E wave deceleration time   168.07       FS   15.0       Glucose 100          100         Mitral Valve Heart Rate   78       IVC diameter   1.62       IVSd   1.4       LA area A2C   28.62       LA area A4C   28.19       LA Vol   96.17       MATTHEW (MOD)   42.4       LVOT area   4.9       LV LATERAL E/E' RATIO   17.17       LV SEPTAL E/E' RATIO   25.75       LV EDV BP   182.19       LV Diastolic Volume Index   80.26       Left Ventricular End Diastolic Volume by Teichholz Method   182.19       LV EDV A2C   [id number]       LV EDV A4C   247.63       Left Ventricular End Systolic Volume by Teichholz Method    "121.92       LV ESV A2C   98.71       LV ESV A4C   93.04       LVIDd   6.0       LVIDs   5.1       LV mass   427.4       LV Mass Index   188.3       Left Ventricular Outflow Tract Mean Gradient   1.01       Left Ventricular Outflow Tract Mean Velocity   0.48       LVOT diameter   2.5       LVOT peak jj   0.7       LVOT stroke volume   57.9       LVOT peak VTI   11.8       LV ESV BP   121.92       LV Systolic Volume Index   53.7       Magnesium  2.1         Mean e'   0.05       Mr max jj   4.59       MR PISA EROA   0.17       MV "A" wave duration   [id number]       MV valve area by continuity eq   2.25       MV mean gradient   3       MV peak gradient   6       MV Peak A Jj   0.90       MV Peak E Jj   1.03       MV VTI   25.7       Corbett's Biplane MOD Ejection Fraction   32       Phosphorus Level 3.8         Radius   0.64       Potassium 3.7          3.7         Pulmonary Valve Mean Velocity   0.54       PV peak gradient   1       PV Peak D Jj   0.47       PV Peak S Jj   0.42       PV PEAK VELOCITY   0.58       Pulm vein S/D ratio   0.89       PW   1.6       RA Vol   41.42       RA Area   15.6       Est. RA pres   3       RA area length vol   39.33       RV S'   11.49       RV/LV Ratio   0.70       RV- mesa basal diam   4.2       Sinus   4.93       Sodium 140          140         STJ   4.25       TAPSE   2.23       TDI SEPTAL   0.04       TDI LATERAL   0.06       Vn Nyquist   0.31       Vn Nyquist MS   0.31       ZLVIDD   -3.41       ZLVIDS   0.00               Significant Imaging: I have reviewed all pertinent imaging results/findings within the past 24 hours.    Imaging Results              X-Ray Chest 1 View (Final result)  Result time 11/21/24 11:30:47      Final result by Richard Salinas MD (11/21/24 11:30:47)                   Impression:      Persistent cardiomegaly.      Electronically signed by: Richard Salinas MD  Date:    11/21/2024  Time:    11:30               Narrative:    EXAMINATION:  XR " CHEST 1 VIEW    CLINICAL HISTORY:  Dyspnea, unspecified    TECHNIQUE:  Single views of the chest were performed.    COMPARISON:  Chest radiograph dated October 7, 2022    FINDINGS:  The trachea and cardiomediastinal silhouette remains stable.  There is no evidence of pleural effusions, pneumothoraces or consolidations.  Lungs are clear.  Osseous structures demonstrate no evidence for acute fractures or dislocations.                                    Echo    Result Date: 11/22/2024    Left Ventricle: The left ventricle is mildly dilated. Severely   increased wall thickness. There is concentric hypertrophy. Severe global   hypokinesis present. There is severely reduced systolic function. Biplane   (2D) method of discs ejection fraction is 32%. There is normal diastolic   function. No thrombus observed using contrast enhanced images.    Differential for cardiomyopathy includes amyloid.  Consider further   outpatient testing warranted.    Aorta: Aortic root is moderately dilated measuring 4.93 cm. Ascending   aorta is moderately dilated measuring 3.80 cm.  Recommend repeat imaging   in 3-6 months if clinically warranted.    Right Ventricle: Mild right ventricular enlargement. Systolic function   is reduced.    Left Atrium: Left atrium is moderately dilated.    Pulmonary Artery: Pulmonary artery pressure could not be estimated.    Pericardium: There is a small effusion. No indication of cardiac   tamponade. Evidence includes no respiratory chamber variation, no   respiratory transvalvular variation.

## 2024-11-23 NOTE — ASSESSMENT & PLAN NOTE
GILMER is likely due to  intrarenal congestion secondary to chronic systolic heart failure . Baseline creatinine is  2 . Most recent creatinine and eGFR are listed below.  Recent Labs     11/21/24  1054 11/22/24  0713 11/23/24  0353   CREATININE 2.3* 2.4*  2.4* 2.7*  2.7*   EGFRNORACEVR 35* 33*  33* 29*  29*        Plan  - GILMER is worsening  - Avoid nephrotoxins and renally dose meds for GFR listed above  - Monitor urine output, serial BMP, and adjust therapy as needed  - keep map over 65  -we will continue to hold losartan/hydrochlorothiazide due to to worsening of kidney failure  -urinalysis with ultrasound of the kidney ordered to exclude any obstructive

## 2024-11-23 NOTE — ASSESSMENT & PLAN NOTE
Patients blood pressure range in the last 24 hours was: BP  Min: 125/75  Max: 176/105.The patient's inpatient anti-hypertensive regimen is listed below:  Current Antihypertensives  furosemide injection 40 mg, Every 12 hours, Intravenous  metoprolol injection 5 mg, Every 6 hours PRN, Intravenous  carvediloL tablet 25 mg, 2 times daily, Oral  hydrALAZINE tablet 50 mg, Every 8 hours, Oral  NIFEdipine 24 hr tablet 60 mg, Daily, Oral    Plan  - BP is controlled, will adjust as follows:  Adding nicardipine drip plus p.o. medications we will monitor close  -

## 2024-11-23 NOTE — ASSESSMENT & PLAN NOTE
Creatine stable for now. BMP reviewed- noted Estimated Creatinine Clearance: 42.8 mL/min (A) (based on SCr of 2.7 mg/dL (H)). according to latest data. Based on current GFR, CKD stage is stage 3 - GFR 30-59.  Monitor UOP and serial BMP and adjust therapy as needed. Renally dose meds. Avoid nephrotoxic medications and procedures.

## 2024-11-23 NOTE — PROGRESS NOTES
[facility] Medicine  Progress Note    Patient Name: Abe Pak  MRN: [id number]  Patient Class: IP- Inpatient   Admission Date: 11/21/2024  Length of Stay: 2 days  Attending Physician: Victorina Craft MD  Primary Care Provider: Monica, Primary Doctor        Subjective:     Principal Problem:Hypertensive emergency        HPI:  43 years old male past medical history of anemia of chronic disease, hypertension, CKD stage IIIB, systolic heart failure, presented to the ED for 5 days' duration of shortness of breath on exertion with leg edema, patient was diagnosed with CHF 4 years ago, last follow-up was on March 20, 2023, patient noncompliant with his home medications , echo from 2022 showed severely decreased systolic function.The estimated ejection fraction is 30%..On admission BNP was 672, troponin was elevated 0.06 , creatinine at 2.3 from baseline of 2, patient was found to have hypertensive emergency received 1 dose of IV hydralazine, in 1 dose of 60 mg IV Lasix, BP still elevated patient was started on nicardipine drip alongside with p.o. medication cardiology was consulted patient was admitted for further medical management.      Overview/Hospital Course:  No notes on file    Interval History:  Patient was seen in the morning   Denies any SOB,CP, bp is still elevated at time of exam, worsening GILMER.      Review of Systems   Constitutional:  Negative for activity change, diaphoresis, fatigue and fever.   HENT: Negative.     Eyes: Negative.    Respiratory:  Negative for shortness of breath and wheezing.    Cardiovascular:  Negative for chest pain, palpitations and leg swelling.   Gastrointestinal: Negative.    Endocrine: Negative.    Genitourinary: Negative.    Musculoskeletal: Negative.    Skin: Negative.    Allergic/Immunologic: Negative.    Neurological: Negative.    Hematological: Negative.    Psychiatric/Behavioral: Negative.       Objective:     Vital Signs (Most Recent):  Temp: 98.6 °F (37  °C) (11/23/24 1154)  Pulse: 76 (11/23/24 1249)  Resp: 18 (11/23/24 1154)  BP: 125/75 (11/23/24 1154)  SpO2: 96 % (11/23/24 1154) Vital Signs (24h Range):  Temp:  [97.7 °F (36.5 °C)-99.2 °F (37.3 °C)] 98.6 °F (37 °C)  Pulse:  [70-91] 76  Resp:  [16-31] 18  SpO2:  [94 %-99 %] 96 %  BP: (125-176)/() 125/75     Weight: 116.1 kg (256 lb)  Body mass index is 38.92 kg/m².    Intake/Output Summary (Last 24 hours) at 11/23/2024 1433  Last data filed at 11/22/2024 1608  Gross per 24 hour   Intake 240 ml   Output --   Net 240 ml         Physical Exam  Constitutional:       Appearance: He is obese.   HENT:      Head: Normocephalic and atraumatic.      Nose: Nose normal.      Mouth/Throat:      Mouth: Mucous membranes are moist.      Pharynx: Oropharynx is clear.   Eyes:      Extraocular Movements: Extraocular movements intact.      Pupils: Pupils are equal, round, and reactive to light.   Cardiovascular:      Rate and Rhythm: Normal rate and regular rhythm.      Heart sounds: No murmur heard.     No friction rub. No gallop.   Pulmonary:      Effort: No respiratory distress.      Breath sounds: No stridor. No wheezing, rhonchi or rales.   Chest:      Chest wall: No tenderness.   Abdominal:      General: Abdomen is flat.      Palpations: Abdomen is soft.   Musculoskeletal:         General: Normal range of motion.      Cervical back: Normal range of motion.      Right lower leg: No edema.      Left lower leg: No edema.   Skin:     General: Skin is warm.      Capillary Refill: Capillary refill takes less than 2 seconds.   Neurological:      General: No focal deficit present.      Mental Status: He is alert and oriented to person, place, and time.   Psychiatric:         Mood and Affect: Mood normal.         Behavior: Behavior normal.             Significant Labs: All pertinent labs within the past 24 hours have been reviewed.  Recent Lab Results         11/23/24  0353   11/22/24  1651        A2C EF   32       A4C EF   33        "Anion Gap 11          11         Ascending aorta   3.80       Ao peak eran   1.2       Ao VTI   17.8       AV valve area   3.3       SILVANA by Velocity Ratio   2.9       AV mean gradient   3.4       AV index (prosthetic)   0.66       AV peak gradient   5.8       AV Velocity Ratio   0.58       BSA   2.36       BUN 36          36         Calcium 9.2          9.2         Chloride 105          105         CO2 24          24         Creatinine 2.7          2.7         Left Ventricle Relative Wall Thickness   0.53       E/A ratio   1.14       E/E' ratio   20.60       eGFR 29          29         E wave deceleration time   168.07       FS   15.0       Glucose 100          100         Mitral Valve Heart Rate   78       IVC diameter   1.62       IVSd   1.4       LA area A2C   28.62       LA area A4C   28.19       LA Vol   96.17       MATTHEW (MOD)   42.4       LVOT area   4.9       LV LATERAL E/E' RATIO   17.17       LV SEPTAL E/E' RATIO   25.75       LV EDV BP   182.19       LV Diastolic Volume Index   80.26       Left Ventricular End Diastolic Volume by Teichholz Method   182.19       LV EDV A2C   205.847197555407733       LV EDV A4C   247.63       Left Ventricular End Systolic Volume by Teichholz Method   121.92       LV ESV A2C   98.71       LV ESV A4C   93.04       LVIDd   6.0       LVIDs   5.1       LV mass   427.4       LV Mass Index   188.3       Left Ventricular Outflow Tract Mean Gradient   1.01       Left Ventricular Outflow Tract Mean Velocity   0.48       LVOT diameter   2.5       LVOT peak eran   0.7       LVOT stroke volume   57.9       LVOT peak VTI   11.8       LV ESV BP   121.92       LV Systolic Volume Index   53.7       Magnesium  2.1         Mean e'   0.05       Mr max eran   4.59       MR PISA EROA   0.17       MV "A" wave duration   [id number]       MV valve area by continuity eq   2.25       MV mean gradient   3       MV peak gradient   6       MV Peak A Eran   0.90       MV Peak E Eran   1.03       MV " VTI   25.7       Corbett's Biplane MOD Ejection Fraction   32       Phosphorus Level 3.8         Radius   0.64       Potassium 3.7          3.7         Pulmonary Valve Mean Velocity   0.54       PV peak gradient   1       PV Peak D Jj   0.47       PV Peak S Jj   0.42       PV PEAK VELOCITY   0.58       Pulm vein S/D ratio   0.89       PW   1.6       RA Vol   41.42       RA Area   15.6       Est. RA pres   3       RA area length vol   39.33       RV S'   11.49       RV/LV Ratio   0.70       RV- mesa basal diam   4.2       Sinus   4.93       Sodium 140          140         STJ   4.25       TAPSE   2.23       TDI SEPTAL   0.04       TDI LATERAL   0.06       Vn Nyquist   0.31       Vn Nyquist MS   0.31       ZLVIDD   -3.41       ZLVIDS   0.00               Significant Imaging: I have reviewed all pertinent imaging results/findings within the past 24 hours.    Imaging Results              X-Ray Chest 1 View (Final result)  Result time 11/21/24 11:30:47      Final result by Richard Salinas MD (11/21/24 11:30:47)                   Impression:      Persistent cardiomegaly.      Electronically signed by: Richard Salinas MD  Date:    11/21/2024  Time:    11:30               Narrative:    EXAMINATION:  XR CHEST 1 VIEW    CLINICAL HISTORY:  Dyspnea, unspecified    TECHNIQUE:  Single views of the chest were performed.    COMPARISON:  Chest radiograph dated October 7, 2022    FINDINGS:  The trachea and cardiomediastinal silhouette remains stable.  There is no evidence of pleural effusions, pneumothoraces or consolidations.  Lungs are clear.  Osseous structures demonstrate no evidence for acute fractures or dislocations.                                    Echo    Result Date: 11/22/2024    Left Ventricle: The left ventricle is mildly dilated. Severely   increased wall thickness. There is concentric hypertrophy. Severe global   hypokinesis present. There is severely reduced systolic function. Biplane   (2D) method of discs ejection  fraction is 32%. There is normal diastolic   function. No thrombus observed using contrast enhanced images.    Differential for cardiomyopathy includes amyloid.  Consider further   outpatient testing warranted.    Aorta: Aortic root is moderately dilated measuring 4.93 cm. Ascending   aorta is moderately dilated measuring 3.80 cm.  Recommend repeat imaging   in 3-6 months if clinically warranted.    Right Ventricle: Mild right ventricular enlargement. Systolic function   is reduced.    Left Atrium: Left atrium is moderately dilated.    Pulmonary Artery: Pulmonary artery pressure could not be estimated.    Pericardium: There is a small effusion. No indication of cardiac   tamponade. Evidence includes no respiratory chamber variation, no   respiratory transvalvular variation.         Assessment/Plan:      * Hypertensive emergency  Patient has a current diagnosis of Hypertensive emergency with end organ damage evidenced by acute kidney injury and acute heart failure which is uncontrolled.  Latest blood pressure and vitals reviewed-   Temp:  [97.7 °F (36.5 °C)-99.2 °F (37.3 °C)]   Pulse:  [70-91]   Resp:  [16-31]   BP: (125-176)/()   SpO2:  [94 %-99 %] .   Patient currently on IV antihypertensives.   Home meds for hypertension were reviewed and noted below.   Hypertension Medications      Nicardipine drip       Medication adjustment for hospital antihypertensives is as follows- patient received 1 dose of IV hydralazine was no response patient was placed on nicardipine drip admitted to ICU, antihypertensive oral medication were added.    Will aim for controlled BP reduction by medications noted above. Monitor and mitigate end organ damage as indicated.    -11/22 blood pressure improved nicardipine drip.  Oral medications were adjusted.    Leg edema  Improved  Secondary to acute on chronic exacerbation of heart failure      Acute heart failure with reduced ejection fraction (HFrEF, <= 40%)  See  above      Hypertension  Patients blood pressure range in the last 24 hours was: BP  Min: 125/75  Max: 176/105.The patient's inpatient anti-hypertensive regimen is listed below:  Current Antihypertensives  furosemide injection 40 mg, Every 12 hours, Intravenous  metoprolol injection 5 mg, Every 6 hours PRN, Intravenous  carvediloL tablet 25 mg, 2 times daily, Oral  hydrALAZINE tablet 50 mg, Every 8 hours, Oral  NIFEdipine 24 hr tablet 60 mg, Daily, Oral    Plan  - BP is controlled, will adjust as follows:  Adding nicardipine drip plus p.o. medications we will monitor close  -    Chronic systolic heart failure    Echo from 2022:  Interpretation Summary  · The left ventricle is moderately enlarged with moderate concentric hypertrophy and severely decreased systolic function.  · The estimated ejection fraction is 30%.  · There is severe left ventricular global hypokinesis.  · Mild mitral regurgitation.  · Mild tricuspid regurgitation.  · Mild pulmonic regurgitation.  · Normal right ventricular size with normal right ventricular systolic function.  · There is no pulmonary hypertension.  · Small anterior pericardial effusion.    -patient of his medication for the past 3-4 months patient does not look like he is in acute decompensated heart failure.  -restarted him on Coreg, Lasix we will hold losartan for now due to his GILMER.  -cardiology consulted    11/22  Pending echo report  -appreciate cards recommendation, due to financial issues and able to start patient on SGLT2 inhibitor(Jardiance) or Entresto, and due to GILMER on CKD we will hold the losartan/hydrochlorothiazide.  -we will continue on Lasix  -we will start process of of applying to medicate for the patient  -follow up appointment with cardiology clinic established for possible ICD    Stage 3b chronic kidney disease (CKD)  Creatine stable for now. BMP reviewed- noted Estimated Creatinine Clearance: 42.8 mL/min (A) (based on SCr of 2.7 mg/dL (H)). according to  latest data. Based on current GFR, CKD stage is stage 3 - GFR 30-59.  Monitor UOP and serial BMP and adjust therapy as needed. Renally dose meds. Avoid nephrotoxic medications and procedures.    Anemia of chronic disease  Anemia is likely due to chronic disease due to Chronic Kidney Disease. Most recent hemoglobin and hematocrit are listed below.  Recent Labs     11/21/24  1054 11/21/24  1631   HGB 12.7* 13.1*   HCT 39.0* 40.5       Plan  - Monitor serial CBC: Daily  - Transfuse PRBC if patient becomes hemodynamically unstable, symptomatic or H/H drops below 7/21.  - Patient has not received any PRBC transfusions to date  - Patient's anemia is currently stable  - we will monitor close    Acute renal failure superimposed on stage 4 chronic kidney disease  GILMER is likely due to  intrarenal congestion secondary to chronic systolic heart failure . Baseline creatinine is  2 . Most recent creatinine and eGFR are listed below.  Recent Labs     11/21/24  1054 11/22/24  0713 11/23/24  0353   CREATININE 2.3* 2.4*  2.4* 2.7*  2.7*   EGFRNORACEVR 35* 33*  33* 29*  29*        Plan  - GILMER is worsening  - Avoid nephrotoxins and renally dose meds for GFR listed above  - Monitor urine output, serial BMP, and adjust therapy as needed  - keep map over 65  -we will continue to hold losartan/hydrochlorothiazide due to to worsening of kidney failure  -urinalysis with ultrasound of the kidney ordered to exclude any obstructive      VTE Risk Mitigation (From admission, onward)           Ordered     heparin (porcine) injection 5,000 Units  Every 8 hours         11/21/24 1306     IP VTE HIGH RISK PATIENT  Once         11/21/24 1306     Place sequential compression device  Until discontinued         11/21/24 1306                    Discharge Planning   CHRIS:      Code Status: Full Code   Is the patient medically ready for discharge?:     Reason for patient still in hospital (select all that apply):  Treatment  Discharge Plan A: Home                   Victorina Garcia MD  Department of [facility] Medicine   [facility]

## 2024-11-23 NOTE — ASSESSMENT & PLAN NOTE
Patient has a current diagnosis of Hypertensive emergency with end organ damage evidenced by acute kidney injury and acute heart failure which is uncontrolled.  Latest blood pressure and vitals reviewed-   Temp:  [97.7 °F (36.5 °C)-99.2 °F (37.3 °C)]   Pulse:  [70-91]   Resp:  [16-31]   BP: (125-176)/()   SpO2:  [94 %-99 %] .   Patient currently on IV antihypertensives.   Home meds for hypertension were reviewed and noted below.   Hypertension Medications      Nicardipine drip       Medication adjustment for hospital antihypertensives is as follows- patient received 1 dose of IV hydralazine was no response patient was placed on nicardipine drip admitted to ICU, antihypertensive oral medication were added.    Will aim for controlled BP reduction by medications noted above. Monitor and mitigate end organ damage as indicated.    -11/22 blood pressure improved nicardipine drip.  Oral medications were adjusted.

## 2024-11-23 NOTE — PT/OT/SLP EVAL
Physical Therapy Evaluation and Discharge    Patient Name:  Abe Pak   MRN:  [id number]    Recommendations:     Discharge Recommendations: No Therapy Indicated   Discharge Equipment Recommendations: none   Barriers to discharge: None    Assessment:     Abe Pak is a 43 y.o. male admitted with a medical diagnosis of Hypertensive emergency.  He presents with the following impairments/functional limitations: Patient evaluated; pt independent with sit to stand and amb in room~120ft with fair pace, no c/o lightheadedness/dizziness, SOB or chest pain; pt found to be functioning at baseline; no further acute PT services indicated at this time .    Recent Surgery: * No surgery found *      Plan:     During this hospitalization, patient does not require further acute PT services. Please re-consult if situation changes    Plan of Care Expires:  11/22/24    Subjective     Chief Complaint: none stated  Patient/Family Comments/goals: return home  Pain/Comfort:  Pain Rating 1: 0/10  Pain Rating Post-Intervention 1: 0/10    Patients cultural, spiritual, Oriental orthodox conflicts given the current situation: no    Living Environment:  Pt lives with mother in first floor apartment with 0STE; tub/shower combo with GB  Prior to admission, patients level of function was independent with amb/transfer without AD, drives, works as .  Equipment used at home: none except GB Upon discharge, patient will have assistance from mother.    Objective:     Communicated with nurse prior to session.  Patient found up in chair with blood pressure cuff, telemetry, pulse ox (continuous)  upon PT entry to room.    General Precautions: Standard, fall  Orthopedic Precautions:N/A   Braces: N/A  Respiratory Status: Room air    Exams:  Cognitive Exam:  Patient is oriented to Person, Place, Time, Situation, and follows multistep commands  Gross Motor Coordination:  WFL  Postural Exam:  Patient presented with the following abnormalities:    -        No postural abnormalities identified  Sensation:    -       Intact  RLE ROM: WFL  RLE Strength: WFL  LLE ROM: WFL  LLE Strength: WFL    Functional Mobility:  Bed Mobility:     Scooting: independence  Sit to Supine: independence  Transfers:     Sit to Stand: independence with no AD  Gait: independent amb without AD ~120ft with fair pace, no LOB, no c/o lightheadedness/SOB/chest pain  Balance: sit/stand/amb~good to good+      AM-PAC 6 CLICK MOBILITY  Total Score:24     Treatment & Education:  -pt educated on role of PT/POC  -found pt up in chair  -BP taken at 145/96 HR 79 SpO2 100%  -pt independent with sit to stand and amb in room~120ft with fair pace, no c/o lightheadedness/dizziness, SOB or chest pain  -while amb, SpO2 97-95% HR 89-82  -patient returned to supine for echo testing  -BP taken at 147/98 HR 76 SpO2 99%  -pt educated on pacing self with activities, pursed lip breathing with exertional activity, taking his medications as prescribed    Patient left HOB elevated with all lines intact, call button in reach, and nurse notified.    GOALS:   Multidisciplinary Problems       Physical Therapy Goals       Not on file              Multidisciplinary Problems (Resolved)          Problem: Physical Therapy    Goal Priority Disciplines Outcome Interventions   Physical Therapy Goal   (Resolved)     PT, PT/OT Met                        History:     Past Medical History:   Diagnosis Date    CHF (congestive heart failure)     Hypertension        History reviewed. No pertinent surgical history.    Time Tracking:     PT Received On: 11/22/24  PT Start Time: 1530     PT Stop Time: 1547  PT Total Time (min): 17 min     Billable Minutes: Evaluation 17      11/22/2024

## 2024-11-24 VITALS
HEIGHT: 68 IN | TEMPERATURE: 99 F | OXYGEN SATURATION: 94 % | RESPIRATION RATE: 18 BRPM | SYSTOLIC BLOOD PRESSURE: 134 MMHG | DIASTOLIC BLOOD PRESSURE: 83 MMHG | HEART RATE: 64 BPM | BODY MASS INDEX: 38.8 KG/M2 | WEIGHT: 256 LBS

## 2024-11-24 LAB
ANION GAP SERPL CALC-SCNC: 12 MMOL/L (ref 8–16)
ANION GAP SERPL CALC-SCNC: 12 MMOL/L (ref 8–16)
BUN SERPL-MCNC: 38 MG/DL (ref 6–20)
BUN SERPL-MCNC: 38 MG/DL (ref 6–20)
CALCIUM SERPL-MCNC: 9.3 MG/DL (ref 8.7–10.5)
CALCIUM SERPL-MCNC: 9.3 MG/DL (ref 8.7–10.5)
CHLORIDE SERPL-SCNC: 105 MMOL/L (ref 95–110)
CHLORIDE SERPL-SCNC: 105 MMOL/L (ref 95–110)
CO2 SERPL-SCNC: 23 MMOL/L (ref 23–29)
CO2 SERPL-SCNC: 23 MMOL/L (ref 23–29)
CREAT SERPL-MCNC: 2.9 MG/DL (ref 0.5–1.4)
CREAT SERPL-MCNC: 2.9 MG/DL (ref 0.5–1.4)
EST. GFR  (NO RACE VARIABLE): 26 ML/MIN/1.73 M^2
EST. GFR  (NO RACE VARIABLE): 26 ML/MIN/1.73 M^2
GLUCOSE SERPL-MCNC: 103 MG/DL (ref 70–110)
GLUCOSE SERPL-MCNC: 103 MG/DL (ref 70–110)
MAGNESIUM SERPL-MCNC: 2.1 MG/DL (ref 1.6–2.6)
PHOSPHATE SERPL-MCNC: 3.8 MG/DL (ref 2.7–4.5)
POTASSIUM SERPL-SCNC: 3.8 MMOL/L (ref 3.5–5.1)
POTASSIUM SERPL-SCNC: 3.8 MMOL/L (ref 3.5–5.1)
SODIUM SERPL-SCNC: 140 MMOL/L (ref 136–145)
SODIUM SERPL-SCNC: 140 MMOL/L (ref 136–145)

## 2024-11-24 PROCEDURE — 63600175 PHARM REV CODE 636 W HCPCS

## 2024-11-24 PROCEDURE — 80048 BASIC METABOLIC PNL TOTAL CA: CPT

## 2024-11-24 PROCEDURE — 83735 ASSAY OF MAGNESIUM: CPT

## 2024-11-24 PROCEDURE — 84100 ASSAY OF PHOSPHORUS: CPT

## 2024-11-24 PROCEDURE — 25000003 PHARM REV CODE 250

## 2024-11-24 PROCEDURE — 36415 COLL VENOUS BLD VENIPUNCTURE: CPT

## 2024-11-24 RX ORDER — FUROSEMIDE 40 MG/1
40 TABLET ORAL DAILY
Qty: 30 TABLET | Refills: 11 | Status: SHIPPED | OUTPATIENT
Start: 2024-11-24 | End: 2025-11-24

## 2024-11-24 RX ORDER — CARVEDILOL 25 MG/1
25 TABLET ORAL 2 TIMES DAILY
Qty: 180 TABLET | Refills: 3 | Status: SHIPPED | OUTPATIENT
Start: 2024-11-24 | End: 2025-11-24

## 2024-11-24 RX ORDER — NAPROXEN SODIUM 220 MG/1
81 TABLET, FILM COATED ORAL DAILY
Qty: 360 TABLET | Refills: 0 | Status: SHIPPED | OUTPATIENT
Start: 2024-11-25 | End: 2025-11-25

## 2024-11-24 RX ORDER — ATORVASTATIN CALCIUM 40 MG/1
40 TABLET, FILM COATED ORAL DAILY
Qty: 90 TABLET | Refills: 3 | Status: SHIPPED | OUTPATIENT
Start: 2024-11-25 | End: 2025-11-25

## 2024-11-24 RX ORDER — FUROSEMIDE 40 MG/1
40 TABLET ORAL DAILY
Status: DISCONTINUED | OUTPATIENT
Start: 2024-11-24 | End: 2024-11-24 | Stop reason: HOSPADM

## 2024-11-24 RX ORDER — NIFEDIPINE 60 MG/1
60 TABLET, EXTENDED RELEASE ORAL DAILY
Qty: 30 TABLET | Refills: 11 | Status: SHIPPED | OUTPATIENT
Start: 2024-11-25 | End: 2025-11-25

## 2024-11-24 RX ADMIN — FUROSEMIDE 40 MG: 10 INJECTION, SOLUTION INTRAMUSCULAR; INTRAVENOUS at 08:11

## 2024-11-24 RX ADMIN — ATORVASTATIN CALCIUM 40 MG: 40 TABLET, FILM COATED ORAL at 08:11

## 2024-11-24 RX ADMIN — MUPIROCIN: 20 OINTMENT TOPICAL at 08:11

## 2024-11-24 RX ADMIN — NIFEDIPINE 60 MG: 30 TABLET, FILM COATED, EXTENDED RELEASE ORAL at 08:11

## 2024-11-24 RX ADMIN — CARVEDILOL 25 MG: 25 TABLET, FILM COATED ORAL at 08:11

## 2024-11-24 RX ADMIN — ASPIRIN 81 MG CHEWABLE TABLET 81 MG: 81 TABLET CHEWABLE at 08:11

## 2024-11-24 RX ADMIN — SUCRALFATE 1 G: 1 TABLET ORAL at 06:11

## 2024-11-24 RX ADMIN — HEPARIN SODIUM 5000 UNITS: 5000 INJECTION INTRAVENOUS; SUBCUTANEOUS at 05:11

## 2024-11-24 NOTE — ASSESSMENT & PLAN NOTE
Patients blood pressure range in the last 24 hours was: BP  Min: 122/83  Max: 139/89.The patient's inpatient anti-hypertensive regimen is listed below:  Current Antihypertensives  metoprolol injection 5 mg, Every 6 hours PRN, Intravenous  carvediloL tablet 25 mg, 2 times daily, Oral  NIFEdipine 24 hr tablet 60 mg, Daily, Oral  furosemide tablet 40 mg, Daily, Oral  carvedilol (COREG) tablet, 2 times daily, Oral  furosemide (LASIX) tablet, Daily, Oral  NIFEdipine (PROCARDIA-XL) 24 hr tablet, Daily, Oral    Plan  - BP is controlled, will adjust as follows:  Adding nicardipine drip plus p.o. medications we will monitor close  -

## 2024-11-24 NOTE — PLAN OF CARE
Discharge orders noted. Additional clinical references attached.    Patient's discharge instructions given by bedside RN and reviewed via this VN with patient.    Education provided on new medication, diagnosis, and follow-up appointments.    All questions answered. Teach back method used. Patient verbalized understanding.    Family to transport patient home. Floor nurse notified.        11/24/24 1120   AVS Confirmation   Discharge instructions and AVS provided to and reviewed with patient and/or significant other. Yes

## 2024-11-24 NOTE — ASSESSMENT & PLAN NOTE
GILMER is likely due to  intrarenal congestion secondary to chronic systolic heart failure . Baseline creatinine is  2 . Most recent creatinine and eGFR are listed below.  Recent Labs     11/22/24  0713 11/23/24  0353 11/24/24  0403   CREATININE 2.4*  2.4* 2.7*  2.7* 2.9*  2.9*   EGFRNORACEVR 33*  33* 29*  29* 26*  26*        Plan  - GILMER is stable  - Avoid nephrotoxins and renally dose meds for GFR listed above  - Monitor urine output, serial BMP, and adjust therapy as needed  - keep map over 65  -we will continue to hold losartan/hydrochlorothiazide due to to worsening of kidney failure  -urinalysis with ultrasound of the kidney ordered to exclude any obstructive

## 2024-11-24 NOTE — NURSING
"pt has a nursing communication order that is a little confusing. order says "hold hydralazine for now recent /84 order written with start 11/22 but has no end date but pt has scheduled hydralazine to be given. Dr Gonzalez messaged via secure chat to clarify if med is to be given or held. MD responded ordered  to "follow her instructions.......continue to hold." Tonight's dose held.  "

## 2024-11-24 NOTE — PLAN OF CARE
Pt will dc with no needs noted. Pt will have transport home by his mother. No additional cm needs at this time.       Follow up with Smiley Fernandez MD  Tuesday Nov 26, 2024  at 1:00 pm  [place] [facility] opening next week so if time does not work he can call clinic, they have many openings.  Patient can transfer to [place] for future care.  They are scheduling 3 weeks out for new patients.    NOTE: Patient needs to bring in photo Hospital summary, ID, Ins Card, and Medicine bottles [id number] [facility] [id number]  [phone]           Follow up with Dr Shamika Mayes on 1/9 at 1:30  Thursday Jan 9, 2025  at 1:30 pm--Cardiology Follow-Up at [facility] previously scheduled. [facility]  [address], [address]  (Enter at [address])  [place] [address]  Phone Number: [phone]     Cleared from CM . Bedside Nurse and VN notified.     11/24/24 0853   Final Note   Assessment Type Final Discharge Note   Anticipated Discharge Disposition Home   Hospital Resources/Appts/Education Provided Appointments scheduled and added to AVS   Post-Acute Status   Discharge Delays None known at this time

## 2024-11-24 NOTE — ASSESSMENT & PLAN NOTE
Creatine stable for now. BMP reviewed- noted Estimated Creatinine Clearance: 39.8 mL/min (A) (based on SCr of 2.9 mg/dL (H)). according to latest data. Based on current GFR, CKD stage is stage 3 - GFR 30-59.  Monitor UOP and serial BMP and adjust therapy as needed. Renally dose meds. Avoid nephrotoxic medications and procedures.

## 2024-11-24 NOTE — PLAN OF CARE
Problem: Adult Inpatient Plan of Care  Goal: Plan of Care Review  Outcome: Progressing     Problem: Heart Failure  Goal: Optimal Coping  Outcome: Progressing

## 2024-11-24 NOTE — DISCHARGE SUMMARY
[facility] Medicine  Discharge Summary      Patient Name: Abe Pak  MRN: [id number]  LAN: [id number]  Patient Class: IP- Inpatient  Admission Date: 11/21/2024  Hospital Length of Stay: 3 days  Discharge Date and Time:  11/24/2024 8:48 AM  Attending Physician: Victorina Craft MD   Discharging Provider: Victorina Garcia MD  Primary Care Provider: Monica, Primary Doctor    Primary Care Team: Networked reference to record PCT     HPI:   43 years old male past medical history of anemia of chronic disease, hypertension, CKD stage IIIB, systolic heart failure, presented to the ED for 5 days' duration of shortness of breath on exertion with leg edema, patient was diagnosed with CHF 4 years ago, last follow-up was on March 20, 2023, patient noncompliant with his home medications , echo from 2022 showed severely decreased systolic function.The estimated ejection fraction is 30%..On admission BNP was 672, troponin was elevated 0.06 , creatinine at 2.3 from baseline of 2, patient was found to have hypertensive emergency received 1 dose of IV hydralazine, in 1 dose of 60 mg IV Lasix, BP still elevated patient was started on nicardipine drip alongside with p.o. medication cardiology was consulted patient was admitted for further medical management.      * No surgery found *      Hospital Course:   Patient was admitted for acute on chronic decompensated heart failure, with hypertensive emergency, patient was off medications for few months prior, patient has no medical insurance, patient was started on p.o. antihypertensive medications and nicardipine drip, BP  improved, patient on was initial started on IV Lasix 40 IV b.i.d. gradually switched into 40 mg p.o..  With improvement of peripheral edema, chest pain and shortness of breath, in the hospital patient was provided with tools to follow-up with establishing medical insurance by the , patient has set up follow up will admit with heart failure clinic  at [facility], [facility].    Goals of Care Treatment Preferences:  Code Status: Full Code      SDOH Screening:  The patient was screened for utility difficulties, food insecurity, transport difficulties, housing insecurity, and interpersonal safety and there were no concerns identified this admission.     Consults:   Consults (From admission, onward)          Status Ordering Provider     Inpatient consult to Social Work/Case Management  Once        Provider:  (Not yet assigned)    Completed JENNIFFER ROSALES     Inpatient consult to Registered Dietitian/Nutritionist  Once        Provider:  (Not yet assigned)    Completed JENNIFFER ROSALES     Inpatient consult to Cardiology-[facility]  Once        Provider:  (Not yet assigned)    Completed JENNIFFER ROSALES            Cardiac/Vascular  * Hypertensive emergency  Patient has a current diagnosis of Hypertensive emergency with end organ damage evidenced by acute kidney injury and acute heart failure which is uncontrolled.  Latest blood pressure and vitals reviewed-   Temp:  [98.1 °F (36.7 °C)-98.6 °F (37 °C)]   Pulse:  [64-86]   Resp:  [18]   BP: (122-139)/(75-92)   SpO2:  [94 %-97 %] .   Patient currently on IV antihypertensives.   Home meds for hypertension were reviewed and noted below.   Hypertension Medications      Nicardipine drip       Medication adjustment for hospital antihypertensives is as follows- patient received 1 dose of IV hydralazine was no response patient was placed on nicardipine drip admitted to ICU, antihypertensive oral medication were added.    Will aim for controlled BP reduction by medications noted above. Monitor and mitigate end organ damage as indicated.    -11/22 blood pressure improved off nicardipine drip.  Oral medications were adjusted.    Acute heart failure with reduced ejection fraction (HFrEF, <= 40%)  See above      Hypertension  Patients blood pressure range in the last 24 hours was: BP  Min: 122/83  Max:  139/89.The patient's inpatient anti-hypertensive regimen is listed below:  Current Antihypertensives  metoprolol injection 5 mg, Every 6 hours PRN, Intravenous  carvediloL tablet 25 mg, 2 times daily, Oral  NIFEdipine 24 hr tablet 60 mg, Daily, Oral  furosemide tablet 40 mg, Daily, Oral  carvedilol (COREG) tablet, 2 times daily, Oral  furosemide (LASIX) tablet, Daily, Oral  NIFEdipine (PROCARDIA-XL) 24 hr tablet, Daily, Oral    Plan  - BP is controlled, will adjust as follows:  Adding nicardipine drip plus p.o. medications we will monitor close  -    Chronic systolic heart failure    Echo from 2022:  Interpretation Summary  · The left ventricle is moderately enlarged with moderate concentric hypertrophy and severely decreased systolic function.  · The estimated ejection fraction is 30%.  · There is severe left ventricular global hypokinesis.  · Mild mitral regurgitation.  · Mild tricuspid regurgitation.  · Mild pulmonic regurgitation.  · Normal right ventricular size with normal right ventricular systolic function.  · There is no pulmonary hypertension.  · Small anterior pericardial effusion.    -patient of his medication for the past 3-4 months patient does not look like he is in acute decompensated heart failure.  -restarted him on Coreg, Lasix we will hold losartan for now due to his GILMER.  -cardiology consulted    11/22  Pending echo report  -appreciate cards recommendation, due to financial issues and able to start patient on SGLT2 inhibitor(Jardiance) or Entresto, and due to GILMER on CKD we will hold the losartan/hydrochlorothiazide.  -we will continue on Lasix  -we will start process of of applying to medicate for the patient  -follow up appointment with cardiology clinic established for possible ICD    Renal/  Stage 3b chronic kidney disease (CKD)  Creatine stable for now. BMP reviewed- noted Estimated Creatinine Clearance: 39.8 mL/min (A) (based on SCr of 2.9 mg/dL (H)). according to latest data. Based on  current GFR, CKD stage is stage 3 - GFR 30-59.  Monitor UOP and serial BMP and adjust therapy as needed. Renally dose meds. Avoid nephrotoxic medications and procedures.    Acute renal failure superimposed on stage 4 chronic kidney disease  GILMER is likely due to  intrarenal congestion secondary to chronic systolic heart failure . Baseline creatinine is  2 . Most recent creatinine and eGFR are listed below.  Recent Labs     11/22/24  0713 11/23/24  0353 11/24/24  0403   CREATININE 2.4*  2.4* 2.7*  2.7* 2.9*  2.9*   EGFRNORACEVR 33*  33* 29*  29* 26*  26*        Plan  - GILMER is stable  - Avoid nephrotoxins and renally dose meds for GFR listed above  - Monitor urine output, serial BMP, and adjust therapy as needed  - keep map over 65  -we will continue to hold losartan/hydrochlorothiazide due to to worsening of kidney failure  -urinalysis with ultrasound of the kidney ordered to exclude any obstructive    Oncology  Anemia of chronic disease  Anemia is likely due to chronic disease due to Chronic Kidney Disease. Most recent hemoglobin and hematocrit are listed below.  Recent Labs     11/21/24  1054 11/21/24  1631   HGB 12.7* 13.1*   HCT 39.0* 40.5       Plan  - Monitor serial CBC: Daily  - Transfuse PRBC if patient becomes hemodynamically unstable, symptomatic or H/H drops below 7/21.  - Patient has not received any PRBC transfusions to date  - Patient's anemia is currently stable  - we will monitor close    Other  Leg edema  Improved  Secondary to acute on chronic exacerbation of heart failure        Final Active Diagnoses:    Diagnosis Date Noted POA    PRINCIPAL PROBLEM:  Hypertensive emergency [I16.1] 10/07/2022 Yes    Acute heart failure with reduced ejection fraction (HFrEF, <= 40%) [I50.21] 11/22/2024 Yes    Leg edema [R60.0] 11/22/2024 Yes    Anemia of chronic disease [D63.8] 10/10/2022 Yes    Stage 3b chronic kidney disease (CKD) [N18.32] 10/10/2022 Yes    Chronic systolic heart failure [I50.22] 10/10/2022  Yes    Hypertension [I10] 10/10/2022 Yes    Acute renal failure superimposed on stage 4 chronic kidney disease [N17.9, N18.4] 10/07/2022 Yes      Problems Resolved During this Admission:       Discharged Condition: fair    Disposition: home    Follow Up:   Follow-up Information       Dr Shamika Mayes on 1/9 at 1:30 Follow up on 1/9/2025.    Why: at 1:30 pm--Cardiology Follow-Up at [facility] previously scheduled.  Contact information:   Cardiology Center   [address] [facility]   (Enter at [address])   [place] [address]   Phone Number: [phone]             Smiley Fernandez MD Follow up on 11/26/2024.    Specialty: Family Medicine  Why: at 1:00 pm  [place] clinic opening next week so if time does not work he can call clinic, they have many openings.  Patient can transfer to [place] for future care.  They are scheduling 3 weeks out for new patients.    NOTE: Patient needs to bring in photo Hospital summary, ID, Ins Card, and Medicine bottles  Contact information:  [address] [facility] [address]  [phone]                           Patient Instructions:   No discharge procedures on file.    Significant Diagnostic Studies:  NA    Pending Diagnostic Studies:       None           Medications:  Home med reconciled    Indwelling Lines/Drains at time of discharge:   Lines/Drains/Airways       None                   Time spent on the discharge of patient: 45 minutes         Victorina Garcia MD  Department of Hospital Medicine  [place] - On license of [facility]

## 2024-11-24 NOTE — PLAN OF CARE
Problem: Adult Inpatient Plan of Care  Goal: Plan of Care Review  Outcome: Progressing     Problem: Heart Failure  Goal: Optimal Coping  Outcome: Progressing     Problem: Hypertension Acute  Goal: Blood Pressure Within Desired Range  Outcome: Progressing

## 2024-11-24 NOTE — ASSESSMENT & PLAN NOTE
Patient has a current diagnosis of Hypertensive emergency with end organ damage evidenced by acute kidney injury and acute heart failure which is uncontrolled.  Latest blood pressure and vitals reviewed-   Temp:  [98.1 °F (36.7 °C)-98.6 °F (37 °C)]   Pulse:  [64-86]   Resp:  [18]   BP: (122-139)/(75-92)   SpO2:  [94 %-97 %] .   Patient currently on IV antihypertensives.   Home meds for hypertension were reviewed and noted below.   Hypertension Medications      Nicardipine drip       Medication adjustment for hospital antihypertensives is as follows- patient received 1 dose of IV hydralazine was no response patient was placed on nicardipine drip admitted to ICU, antihypertensive oral medication were added.    Will aim for controlled BP reduction by medications noted above. Monitor and mitigate end organ damage as indicated.    -11/22 blood pressure improved off nicardipine drip.  Oral medications were adjusted.

## 2024-11-25 LAB
OHS QRS DURATION: 86 MS
OHS QTC CALCULATION: 450 MS